# Patient Record
Sex: MALE | Race: WHITE | Employment: FULL TIME | URBAN - METROPOLITAN AREA
[De-identification: names, ages, dates, MRNs, and addresses within clinical notes are randomized per-mention and may not be internally consistent; named-entity substitution may affect disease eponyms.]

---

## 2017-01-16 ENCOUNTER — HOSPITAL ENCOUNTER (OUTPATIENT)
Dept: MRI IMAGING | Age: 52
Discharge: HOME | End: 2017-01-16
Payer: COMMERCIAL

## 2017-01-16 DIAGNOSIS — M47.22 CERVICAL RADICULOPATHY DUE TO DEGENERATIVE JOINT DISEASE OF SPINE: ICD-10-CM

## 2017-01-16 PROCEDURE — 72141 MRI NECK SPINE W/O DYE: CPT

## 2021-10-18 ENCOUNTER — HOSPITAL ENCOUNTER (EMERGENCY)
Age: 56
Discharge: PSYCHIATRIC OR SUBSTANCE USE FACILITY | End: 2021-10-19
Attending: EMERGENCY MEDICINE
Payer: COMMERCIAL

## 2021-10-18 DIAGNOSIS — R45.89 SUICIDAL BEHAVIOR WITHOUT ATTEMPTED SELF-INJURY: Primary | ICD-10-CM

## 2021-10-18 LAB
BASOPHILS # BLD: 0.1 K/UL (ref 0–0.1)
BASOPHILS NFR BLD: 1 % (ref 0–1)
DIFFERENTIAL METHOD BLD: ABNORMAL
EOSINOPHIL # BLD: 0.2 K/UL (ref 0–0.4)
EOSINOPHIL NFR BLD: 2 % (ref 0–7)
ERYTHROCYTE [DISTWIDTH] IN BLOOD BY AUTOMATED COUNT: 12 % (ref 11.5–14.5)
HCT VFR BLD AUTO: 47 % (ref 36.6–50.3)
HGB BLD-MCNC: 15.9 G/DL (ref 12.1–17)
IMM GRANULOCYTES # BLD AUTO: 0.1 K/UL (ref 0–0.04)
IMM GRANULOCYTES NFR BLD AUTO: 1 % (ref 0–0.5)
LYMPHOCYTES # BLD: 1.8 K/UL (ref 0.8–3.5)
LYMPHOCYTES NFR BLD: 14 % (ref 12–49)
MCH RBC QN AUTO: 29.5 PG (ref 26–34)
MCHC RBC AUTO-ENTMCNC: 33.8 G/DL (ref 30–36.5)
MCV RBC AUTO: 87.2 FL (ref 80–99)
MONOCYTES # BLD: 0.9 K/UL (ref 0–1)
MONOCYTES NFR BLD: 7 % (ref 5–13)
NEUTS SEG # BLD: 9.8 K/UL (ref 1.8–8)
NEUTS SEG NFR BLD: 75 % (ref 32–75)
NRBC # BLD: 0 K/UL (ref 0–0.01)
NRBC BLD-RTO: 0 PER 100 WBC
PLATELET # BLD AUTO: 317 K/UL (ref 150–400)
PMV BLD AUTO: 10.2 FL (ref 8.9–12.9)
RBC # BLD AUTO: 5.39 M/UL (ref 4.1–5.7)
WBC # BLD AUTO: 12.8 K/UL (ref 4.1–11.1)

## 2021-10-18 PROCEDURE — 85025 COMPLETE CBC W/AUTO DIFF WBC: CPT

## 2021-10-18 PROCEDURE — 80053 COMPREHEN METABOLIC PANEL: CPT

## 2021-10-18 PROCEDURE — 82077 ASSAY SPEC XCP UR&BREATH IA: CPT

## 2021-10-18 PROCEDURE — 80143 DRUG ASSAY ACETAMINOPHEN: CPT

## 2021-10-18 PROCEDURE — 99284 EMERGENCY DEPT VISIT MOD MDM: CPT

## 2021-10-18 PROCEDURE — 80179 DRUG ASSAY SALICYLATE: CPT

## 2021-10-19 ENCOUNTER — HOSPITAL ENCOUNTER (INPATIENT)
Age: 56
LOS: 2 days | Discharge: HOME | DRG: 881 | End: 2021-10-21
Attending: PSYCHIATRY & NEUROLOGY | Admitting: PSYCHIATRY & NEUROLOGY
Payer: COMMERCIAL

## 2021-10-19 VITALS
HEIGHT: 72 IN | WEIGHT: 202 LBS | RESPIRATION RATE: 17 BRPM | TEMPERATURE: 98 F | SYSTOLIC BLOOD PRESSURE: 133 MMHG | OXYGEN SATURATION: 95 % | DIASTOLIC BLOOD PRESSURE: 90 MMHG | BODY MASS INDEX: 27.36 KG/M2 | HEART RATE: 92 BPM

## 2021-10-19 PROBLEM — F32.1 MAJOR DEPRESSIVE DISORDER, SINGLE EPISODE, MODERATE (HCC): Status: ACTIVE | Noted: 2021-10-19

## 2021-10-19 PROBLEM — F43.21 ADJUSTMENT DISORDER WITH DEPRESSED MOOD: Status: ACTIVE | Noted: 2021-10-19

## 2021-10-19 LAB
ALBUMIN SERPL-MCNC: 3.9 G/DL (ref 3.5–5)
ALBUMIN/GLOB SERPL: 1.1 {RATIO} (ref 1.1–2.2)
ALP SERPL-CCNC: 53 U/L (ref 45–117)
ALT SERPL-CCNC: 32 U/L (ref 12–78)
AMPHET UR QL SCN: NEGATIVE
ANION GAP SERPL CALC-SCNC: 11 MMOL/L (ref 5–15)
APAP SERPL-MCNC: <2 UG/ML (ref 10–30)
APPEARANCE UR: CLEAR
AST SERPL-CCNC: 19 U/L (ref 15–37)
ATRIAL RATE: 86 BPM
BACTERIA URNS QL MICRO: NEGATIVE /HPF
BARBITURATES UR QL SCN: NEGATIVE
BENZODIAZ UR QL: NEGATIVE
BILIRUB SERPL-MCNC: 0.4 MG/DL (ref 0.2–1)
BILIRUB UR QL: NEGATIVE
BUN SERPL-MCNC: 9 MG/DL (ref 6–20)
BUN/CREAT SERPL: 11 (ref 12–20)
CALCIUM SERPL-MCNC: 9.3 MG/DL (ref 8.5–10.1)
CALCULATED P AXIS, ECG09: 42 DEGREES
CALCULATED R AXIS, ECG10: 55 DEGREES
CALCULATED T AXIS, ECG11: 39 DEGREES
CANNABINOIDS UR QL SCN: NEGATIVE
CHLORIDE SERPL-SCNC: 103 MMOL/L (ref 97–108)
CO2 SERPL-SCNC: 27 MMOL/L (ref 21–32)
COCAINE UR QL SCN: NEGATIVE
COLOR UR: ABNORMAL
COVID-19 RAPID TEST, COVR: NOT DETECTED
CREAT SERPL-MCNC: 0.81 MG/DL (ref 0.7–1.3)
DIAGNOSIS, 93000: NORMAL
DRUG SCRN COMMENT,DRGCM: NORMAL
EPITH CASTS URNS QL MICRO: ABNORMAL /LPF
ETHANOL SERPL-MCNC: <10 MG/DL
FLUAV RNA SPEC QL NAA+PROBE: NOT DETECTED
FLUBV RNA SPEC QL NAA+PROBE: NOT DETECTED
GLOBULIN SER CALC-MCNC: 3.7 G/DL (ref 2–4)
GLUCOSE SERPL-MCNC: 104 MG/DL (ref 65–100)
GLUCOSE UR STRIP.AUTO-MCNC: NEGATIVE MG/DL
HGB UR QL STRIP: NEGATIVE
KETONES UR QL STRIP.AUTO: 40 MG/DL
LEUKOCYTE ESTERASE UR QL STRIP.AUTO: NEGATIVE
METHADONE UR QL: NEGATIVE
NITRITE UR QL STRIP.AUTO: NEGATIVE
OPIATES UR QL: NEGATIVE
P-R INTERVAL, ECG05: 144 MS
PCP UR QL: NEGATIVE
PH UR STRIP: 6 [PH] (ref 5–8)
POTASSIUM SERPL-SCNC: 3.5 MMOL/L (ref 3.5–5.1)
PROT SERPL-MCNC: 7.6 G/DL (ref 6.4–8.2)
PROT UR STRIP-MCNC: NEGATIVE MG/DL
Q-T INTERVAL, ECG07: 372 MS
QRS DURATION, ECG06: 90 MS
QTC CALCULATION (BEZET), ECG08: 445 MS
RBC #/AREA URNS HPF: ABNORMAL /HPF (ref 0–5)
SALICYLATES SERPL-MCNC: 2.7 MG/DL (ref 2.8–20)
SARS-COV-2, COV2: NOT DETECTED
SODIUM SERPL-SCNC: 141 MMOL/L (ref 136–145)
SOURCE, COVRS: NORMAL
SP GR UR REFRACTOMETRY: 1.01 (ref 1–1.03)
UA: UC IF INDICATED,UAUC: ABNORMAL
UROBILINOGEN UR QL STRIP.AUTO: 0.2 EU/DL (ref 0.2–1)
VENTRICULAR RATE, ECG03: 86 BPM
WBC URNS QL MICRO: ABNORMAL /HPF (ref 0–4)

## 2021-10-19 PROCEDURE — 80307 DRUG TEST PRSMV CHEM ANLYZR: CPT

## 2021-10-19 PROCEDURE — 81001 URINALYSIS AUTO W/SCOPE: CPT

## 2021-10-19 PROCEDURE — 93005 ELECTROCARDIOGRAM TRACING: CPT

## 2021-10-19 PROCEDURE — 90792 PSYCH DIAG EVAL W/MED SRVCS: CPT | Performed by: PSYCHIATRY & NEUROLOGY

## 2021-10-19 PROCEDURE — 74011250637 HC RX REV CODE- 250/637: Performed by: PSYCHIATRY & NEUROLOGY

## 2021-10-19 PROCEDURE — 87636 SARSCOV2 & INF A&B AMP PRB: CPT

## 2021-10-19 PROCEDURE — 87635 SARS-COV-2 COVID-19 AMP PRB: CPT

## 2021-10-19 PROCEDURE — 65220000003 HC RM SEMIPRIVATE PSYCH

## 2021-10-19 RX ORDER — HYDROXYZINE PAMOATE 50 MG/1
50 CAPSULE ORAL
Status: DISCONTINUED | OUTPATIENT
Start: 2021-10-19 | End: 2021-10-21 | Stop reason: HOSPADM

## 2021-10-19 RX ORDER — ACETAMINOPHEN 325 MG/1
650 TABLET ORAL
Status: DISCONTINUED | OUTPATIENT
Start: 2021-10-19 | End: 2021-10-21 | Stop reason: HOSPADM

## 2021-10-19 RX ORDER — CITALOPRAM 20 MG/1
10 TABLET, FILM COATED ORAL DAILY
Status: DISCONTINUED | OUTPATIENT
Start: 2021-10-19 | End: 2021-10-21 | Stop reason: HOSPADM

## 2021-10-19 RX ORDER — ADHESIVE BANDAGE
30 BANDAGE TOPICAL DAILY PRN
Status: DISCONTINUED | OUTPATIENT
Start: 2021-10-19 | End: 2021-10-21 | Stop reason: HOSPADM

## 2021-10-19 RX ORDER — TRAZODONE HYDROCHLORIDE 50 MG/1
50 TABLET ORAL
Status: DISCONTINUED | OUTPATIENT
Start: 2021-10-19 | End: 2021-10-21 | Stop reason: HOSPADM

## 2021-10-19 RX ORDER — IBUPROFEN 200 MG
1 TABLET ORAL
Status: DISCONTINUED | OUTPATIENT
Start: 2021-10-19 | End: 2021-10-21 | Stop reason: HOSPADM

## 2021-10-19 RX ORDER — MAG HYDROX/ALUMINUM HYD/SIMETH 200-200-20
30 SUSPENSION, ORAL (FINAL DOSE FORM) ORAL
Status: DISCONTINUED | OUTPATIENT
Start: 2021-10-19 | End: 2021-10-21 | Stop reason: HOSPADM

## 2021-10-19 RX ORDER — IBUPROFEN 400 MG/1
400 TABLET ORAL
Status: DISCONTINUED | OUTPATIENT
Start: 2021-10-19 | End: 2021-10-21 | Stop reason: HOSPADM

## 2021-10-19 RX ADMIN — CITALOPRAM HYDROBROMIDE 10 MG: 20 TABLET ORAL at 15:53

## 2021-10-19 NOTE — PROGRESS NOTES
Problem: Falls - Risk of  Goal: *Absence of Falls  Description: Document Tripp Durham Fall Risk and appropriate interventions in the flowsheet.   Outcome: Progressing Towards Goal  Note: Fall Risk Interventions:            Medication Interventions: Teach patient to arise slowly                   Problem: Patient Education: Go to Patient Education Activity  Goal: Patient/Family Education  Outcome: Progressing Towards Goal

## 2021-10-19 NOTE — ROUTINE PROCESS
Shift change report given to Court Shameka Rn, re: SBAR. Medications, and behavior.   Jeanette Fall Risk Score (1)

## 2021-10-19 NOTE — BH NOTES
Shift change report given to Jacquelyn Radford RN re: SBAR, medications, and behavior.   Jeanette fall risk score 1

## 2021-10-19 NOTE — BH NOTES
TRANSFER - IN REPORT:    Verbal report received from Shama Ogden RN  on Darylene Friedlander  being received from  Silvano Tolbert for routine progression of care      Report consisted of patients Situation, Background, Assessment and   Recommendations(SBAR). Information from the following report(s) SBAR, Intake/Output, MAR, Recent Results and Med Rec Status was reviewed with the receiving nurse. Opportunity for questions and clarification was provided. Assessment completed upon patients arrival to unit and care assumed.

## 2021-10-19 NOTE — ED PROVIDER NOTES
Date of Service:  10/18/2021    Patient:  Nan Millan    Chief Complaint:  Mental Health Problem       HPI:  Nan Millan is a 55 y.o.  male who presents for evaluation of mental health evaluation. Patient's been having some issues with his wife. At some point tonight there seem to have been an argument where the patient notes that he \"halfheartedly\" threatened to kill himself. He states that he did this out of attention. He denies any homicidal or suicidal ideations. He denies any history of psychiatric issues. He arrives here under the care of [address] [place] [address] denying any acute complaints otherwise. Past Medical History:   Diagnosis Date    Chest pain     Mitral regurgitation     Myxomatous mitral valve        No past surgical history on file. Family History:   Problem Relation Age of Onset    Heart Disease Neg Hx     Hypertension Father     Diabetes Maternal Grandmother     Diabetes Maternal Grandfather        Social History     Socioeconomic History    Marital status:      Spouse name: Not on file    Number of children: 2    Years of education: Not on file    Highest education level: Not on file   Occupational History    Occupation: manager   Tobacco Use    Smoking status: Former Smoker     Packs/day: 1.00     Years: 5.00     Pack years: 5.00    Smokeless tobacco: Former User     Quit date: 2/1/2008   Substance and Sexual Activity    Alcohol use: Yes     Comment: beer on the weekends    Drug use: No    Sexual activity: Not on file   Other Topics Concern    Not on file   Social History Narrative    Not on file     Social Determinants of Health     Financial Resource Strain:     Difficulty of Paying Living Expenses:    Food Insecurity:     Worried About [address] in the Last Year:     [address] in the Last Year:    Transportation Needs:     Lack of Transportation (Medical):      Lack of Transportation (Non-Medical): Physical Activity:     Days of Exercise per Week:     Minutes of Exercise per Session:    Stress:     Feeling of Stress :    Social Connections:     Frequency of Communication with Friends and Family:     Frequency of Social Gatherings with Friends and Family:     Attends Yarsani Services:     Active Member of Clubs or Organizations:     Attends Club or Organization Meetings:     Marital Status:    Intimate Partner Violence:     Fear of Current or Ex-Partner:     Emotionally Abused:     Physically Abused:     Sexually Abused: ALLERGIES: Patient has no known allergies. Review of Systems   Constitutional: Negative for fever. HENT: Negative for hearing loss. Eyes: Negative for visual disturbance. Respiratory: Negative for shortness of breath. Cardiovascular: Negative for chest pain. Gastrointestinal: Negative for abdominal pain. Genitourinary: Negative for flank pain. Musculoskeletal: Negative for back pain. Skin: Negative for rash. Neurological: Negative for dizziness and light-headedness. Psychiatric/Behavioral: Negative for confusion and hallucinations. Vitals:    10/18/21 2255   BP: (!) 144/91   Pulse: 94   Resp: 16   Temp: 97.8 °F (36.6 °C)   SpO2: 97%   Weight: 91.6 kg (202 lb)   Height: 6' (1.829 m)            Physical Exam  Vitals and nursing note reviewed. Constitutional:       Appearance: Normal appearance. HENT:      Head: Normocephalic and atraumatic. Eyes:      General: No scleral icterus. Cardiovascular:      Rate and Rhythm: Normal rate. Pulmonary:      Effort: Pulmonary effort is normal.   Abdominal:      General: Abdomen is flat. Musculoskeletal:         General: Normal range of motion. Skin:     General: Skin is warm. Capillary Refill: Capillary refill takes less than 2 seconds. Neurological:      Mental Status: He is alert and oriented to person, place, and time.    Psychiatric:         Mood and Affect: Mood normal. Thought Content: Thought content is not paranoid or delusional. Thought content does not include homicidal or suicidal ideation. Thought content does not include suicidal plan. [facility]  ED Course as of Oct 19 0333   Tue Oct 19, 2021   0021 EKG 00 16  Normal sinus rhythm at 86 bpm with normal axis, normal intervals. No STEMI    [GG]   0133 COVID-19 rapid test: Not detected [GG]      ED Course User Index  [GG] Charley Apo, DO     VITAL SIGNS:  No data found. LABS:  Recent Results (from the past 6 hour(s))   CBC WITH AUTOMATED DIFF    Collection Time: 10/18/21 11:19 PM   Result Value Ref Range    WBC 12.8 (H) 4.1 - 11.1 K/uL    RBC 5.39 4. 10 - 5.70 M/uL    HGB 15.9 12.1 - 17.0 g/dL    HCT 47.0 36.6 - 50.3 %    MCV 87.2 80.0 - 99.0 FL    MCH 29.5 26.0 - 34.0 PG    MCHC 33.8 30.0 - 36.5 g/dL    RDW 12.0 11.5 - 14.5 %    PLATELET 151 496 - 853 K/uL    MPV 10.2 8.9 - 12.9 FL    NRBC 0.0 0  WBC    ABSOLUTE NRBC 0.00 0.00 - 0.01 K/uL    NEUTROPHILS 75 32 - 75 %    LYMPHOCYTES 14 12 - 49 %    MONOCYTES 7 5 - 13 %    EOSINOPHILS 2 0 - 7 %    BASOPHILS 1 0 - 1 %    IMMATURE GRANULOCYTES 1 (H) 0.0 - 0.5 %    ABS. NEUTROPHILS 9.8 (H) 1.8 - 8.0 K/UL    ABS. LYMPHOCYTES 1.8 0.8 - 3.5 K/UL    ABS. MONOCYTES 0.9 0.0 - 1.0 K/UL    ABS. EOSINOPHILS 0.2 0.0 - 0.4 K/UL    ABS. BASOPHILS 0.1 0.0 - 0.1 K/UL    ABS. IMM.  GRANS. 0.1 (H) 0.00 - 0.04 K/UL    DF AUTOMATED     METABOLIC PANEL, COMPREHENSIVE    Collection Time: 10/18/21 11:19 PM   Result Value Ref Range    Sodium 141 136 - 145 mmol/L    Potassium 3.5 3.5 - 5.1 mmol/L    Chloride 103 97 - 108 mmol/L    CO2 27 21 - 32 mmol/L    Anion gap 11 5 - 15 mmol/L    Glucose 104 (H) 65 - 100 mg/dL    BUN 9 6 - 20 MG/DL    Creatinine 0.81 0.70 - 1.30 MG/DL    BUN/Creatinine ratio 11 (L) 12 - 20      GFR est AA >60 >60 ml/min/1.73m2    GFR est non-AA >60 >60 ml/min/1.73m2    Calcium 9.3 8.5 - 10.1 MG/DL    Bilirubin, total 0.4 0.2 - 1.0 MG/DL    ALT (SGPT) 32 12 - 78 U/L    AST (SGOT) 19 15 - 37 U/L    Alk. phosphatase 53 45 - 117 U/L    Protein, total 7.6 6.4 - 8.2 g/dL    Albumin 3.9 3.5 - 5.0 g/dL    Globulin 3.7 2.0 - 4.0 g/dL    A-G Ratio 1.1 1.1 - 2.2     ETHYL ALCOHOL    Collection Time: 10/18/21 11:19 PM   Result Value Ref Range    ALCOHOL(ETHYL),SERUM <13 <41 MG/DL   SALICYLATE    Collection Time: 10/18/21 11:19 PM   Result Value Ref Range    Salicylate level 2.7 (L) 2.8 - 20.0 MG/DL   ACETAMINOPHEN    Collection Time: 10/18/21 11:19 PM   Result Value Ref Range    Acetaminophen level <2 (L) 10 - 30 ug/mL   COVID-19 RAPID TEST    Collection Time: 10/19/21 12:00 AM   Result Value Ref Range    Specimen source Nasopharyngeal      COVID-19 rapid test Not detected NOTD     URINALYSIS W/ REFLEX CULTURE    Collection Time: 10/19/21  1:30 AM    Specimen: Urine   Result Value Ref Range    Color YELLOW/STRAW      Appearance CLEAR CLEAR      Specific gravity 1.015 1.003 - 1.030      pH (UA) 6.0 5.0 - 8.0      Protein Negative NEG mg/dL    Glucose Negative NEG mg/dL    Ketone 40 (A) NEG mg/dL    Bilirubin Negative NEG      Blood Negative NEG      Urobilinogen 0.2 0.2 - 1.0 EU/dL    Nitrites Negative NEG      Leukocyte Esterase Negative NEG      WBC 0-4 0 - 4 /hpf    RBC 0-5 0 - 5 /hpf    Epithelial cells FEW FEW /lpf    Bacteria Negative NEG /hpf    UA:UC IF INDICATED CULTURE NOT INDICATED BY UA RESULT     DRUG SCREEN, URINE    Collection Time: 10/19/21  1:30 AM   Result Value Ref Range    AMPHETAMINES Negative NEG      BARBITURATES Negative NEG      BENZODIAZEPINES Negative NEG      COCAINE Negative NEG      METHADONE Negative NEG      OPIATES Negative NEG      PCP(PHENCYCLIDINE) Negative NEG      THC (TH-CANNABINOL) Negative NEG      Drug screen comment (NOTE)         IMAGING:  No orders to display         Medications During Visit:  Medications - No data to display      DECISION MAKING:  Neel Medrano is a 55 y.o. male who comes in as above.   Here, patient admits to threatening to kill himself earlier. Per nursing and police, patient had earlier given away belongings, written a note and was walking around the house with a gun to his head and had to be talked down by police. Patient is agreeable to stay. He is medically clear. Patient awaiting placement for psychiatric admission      IMPRESSION:  1.  Suicidal behavior without attempted self-injury        DISPOSITION:  Behavioral Health Hold          Procedures
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
## 2021-10-19 NOTE — BH NOTES
Admission Note:     Patient arrived on the unit @ 1040 on a TDO admission from [address]. Patient was escorted on the unit in wheelchair by [address] [facility] [address] [facility]. Dr. Fay Stout, Akiko Weber, Nurse Manager and Corona Nadine, Nursing Supervisor were notified of patient's arrival. Consult and Jama Kirkland, for Medical H&P. Patient alert and oriented x 4. Calm and cooperative. Mood/affect depressed. Speech clear. Denies SI/HI/AVH and pain. No delusional statements made. Patient denied having a license with [employer]. Patient smokes cigarettes daily. Patient was counseled on smoking cessation to include recognizing dangerous situations, coping skills, and information on quitting. Patient verbalized understanding/ Nicotine patch ordered. Patient refused nicotine patch at this time. atient denies ETOH use and substance abuse. Treatment plan started. Patient placed on Q 15 minute safety checks, suicide, and fall precautions. Rolf Bean

## 2021-10-19 NOTE — PROGRESS NOTES
Problem: Falls - Risk of  Goal: *Absence of Falls  Description: Document Collin Christina Fall Risk and appropriate interventions in the flowsheet.   Outcome: Progressing Towards Goal  Note: Fall Risk Interventions:            Medication Interventions: Teach patient to arise slowly

## 2021-10-19 NOTE — MASTER TREATMENT PLAN
[facility]  Master Treatment Plan for Audrey Hunger    Date Treatment Plan Initiated: 10/19/2021    Treatment Plan Modalities:  Type of Modality Amount  (x minutes) Frequency (x/week) Duration (x days) Name of Responsible Staff   [facility] meetings to encourage peer interactions 30 14 1 Cruz Ramos., CNA/MHT  Lauren Wick., MHT   Group psychotherapy to assist in building coping skills and internal controls 60 5 1 Gali Dorsey., SHAZIAW  [facility] Park., [facility]   Therapeutic activity groups to build coping skills 61 7 1 Ashleigh Gama., MHT     Psychoeducation in group setting to address:   Medication education  Coping Skills  Symptom Management   60 7 1 Gali Dorsey., SHAZIAW  Jc Park., [facility]  Jeff Ho., PAULO Rodriguez RN   Discharge planning   60 2 1 Yadiel Starr.,    Spirituality    60 2 1 Ki William.   Physician medication management   15 7 1 MD SHAKILA Lopez. Lindsay Cervantes MD                                       Treatment Team Signatures    I have participated in the development of this plan of treatment and agree to its implementation.     Patient Signature       Patient Printed Name Date/Time   Social Work/Therapist Signature       Social Work/Therapist Printed Name Date/Time   RN Signature       RN Printed Name Date/Time   Other Signature     Other Signature Date/Time   MD Signature       MD Printed Name Date/Time

## 2021-10-19 NOTE — ED TRIAGE NOTES
Triage note:[facility] for suicidal ideation. patient denies being suicidal denies any pain or discomfort. patient is calm and cooperative. police at bedside.

## 2021-10-20 LAB
CHOLEST SERPL-MCNC: 164 MG/DL
COMMENT, HOLDF: NORMAL
HDLC SERPL-MCNC: 51 MG/DL
HDLC SERPL: 3.2 {RATIO} (ref 0–5)
LDLC SERPL CALC-MCNC: 97 MG/DL (ref 0–100)
SAMPLES BEING HELD,HOLD: NORMAL
TRIGL SERPL-MCNC: 80 MG/DL (ref ?–150)
TSH SERPL DL<=0.05 MIU/L-ACNC: 1.03 UIU/ML (ref 0.36–3.74)
VLDLC SERPL CALC-MCNC: 16 MG/DL

## 2021-10-20 PROCEDURE — 65220000003 HC RM SEMIPRIVATE PSYCH

## 2021-10-20 PROCEDURE — 74011250637 HC RX REV CODE- 250/637: Performed by: PSYCHIATRY & NEUROLOGY

## 2021-10-20 PROCEDURE — 80061 LIPID PANEL: CPT

## 2021-10-20 PROCEDURE — 36415 COLL VENOUS BLD VENIPUNCTURE: CPT

## 2021-10-20 PROCEDURE — 99231 SBSQ HOSP IP/OBS SF/LOW 25: CPT | Performed by: PSYCHIATRY & NEUROLOGY

## 2021-10-20 PROCEDURE — 84443 ASSAY THYROID STIM HORMONE: CPT

## 2021-10-20 RX ADMIN — CITALOPRAM HYDROBROMIDE 10 MG: 20 TABLET ORAL at 10:12
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
## 2021-10-20 NOTE — ROUTINE PROCESS
Shift change report given to Belem Leal RN; re:SBAR, medications, and behavior. Jeanette Fall Risk Score 1.

## 2021-10-20 NOTE — ROUTINE PROCESS
[place] [id number] ([phone])            October 20, 2021      To Whom It May Concern: This letter is verification that Audrey Nolan is hospitalized at [facility] starting 10/19/21.  If you have any further questions, you may contact me at [phone]      Sincerely,      Abdi Leiva,

## 2021-10-20 NOTE — PROGRESS NOTES
10/20/21 [address] meeting   Attendance Did not attend   Number of participants 4   Time in 1115   Time out 1145   Total Time 30   MTP problem Depression

## 2021-10-20 NOTE — GROUP NOTE
[facility]  GROUP DOCUMENTATION INDIVIDUAL                                                                          Group Therapy Note    Date: 10/20/2021    Group Start Time: 0900  Group End Time: 7097  Group Topic: Process Group - Inpatient    [address] OP    [place], [address] [address] [facility] GROUP DOCUMENTATION GROUP    Group Therapy Note  Focus of session was on conflict resolution and strategies to help us keep calm in a conflict with others. I shared how conflicts can increase the production of cortisol and adrenaline and how that prepares us to either take flight or fight. We spoke about how conflicts can be over something pretty minor to values and very important issues in our lives. I shared how the increase in hormones affects us when we need to be clear minded and aware but those hormones can create a disorientation and confusion. I shared with group some strategies to help us stay calm in a conflict and they included taking deep breaths which actually interferes with the production of these hormones, focusing on body and where we can work to relax it, listen carefully and ask open ended questions, lower our voices, and work to let go and agree to disagree with others. We spoke about with whom group members can practice these strategies         Attendance: Attended     Patient's Goal:    Verbalizes anger, guilt, and other feelings in a constructive manor          Interventions/techniques: Informed, Validated, Promoted peer support, Provide feedback, Reinforced and Supported    Follows Directions:  Followed directions    Interactions: Interacted appropriately    Mental Status: Anxious, Calm, Congruent, Depressed and Worried    Behavior/appearance: Attentive, Cooperative and Motivated    Goals Achieved: Able to engage in interactions, Able to listen to others, Able to reflect/comment on own behavior, Able to manage/cope with feelings, Able to receive feedback, Able to self-disclose, Discussed coping, Discussed discharge plans, Discussed self-esteem issues, Discussed safety plan, Displayed empathy, Identified feelings, Identified triggers and Identified relapse prevention strategies      Additional Notes:  Pt participated and engaged in the group discussion. He shared a lot about his wife and the fact that she is Bipolar and is an alcoholic. He at this time is not taking her calls. His time with her in the last year has been \"roller coaster ride\"It is good when she is not drinking but chaotic when she is not taking her medicine and drinking. He is struggling with \"not giving up on her\" but also knows his relationship with her and her relationship with his children is unhealthy. He admits that he did not want to kill himself but wanted to get her attention. We discussed discharge plans and he will continue to go to therapy and [facility] meetings.      Domingo Meza

## 2021-10-20 NOTE — SUICIDE SAFETY PLAN
SAFETY PLAN    A suicide Safety Plan is a document that supports someone when they are having thoughts of suicide. Warning Signs that indicate a suicidal crisis may be developing: What (situations, thoughts, feelings, body sensations, behaviors, etc.) do you experience that lets you know you are beginning to think about suicide? 1. Detach from memories  2. Lies and misleading comments  3. Thinking about all of her statements    Internal Coping Strategies:  What things can I do (relaxation techniques, hobbies, physical activities, etc.) to take my mind off my problems without contacting another person? 1. No contact with ling  2. Continue individual therapy and alanon  3. Continue daily exercise    People and social settings that provide distraction: Who can I call or where can I go to distract me? 1. Name: Carie Jose Juan  Phone: [phone]  2. Name:rishabh   Phone:    3. Place:shailesh             4. Place: vero    People whom I can ask for help: Who can I call when I need help - for example, friends, family, clergy, someone else? 1. Name: Saul                 Phone:   2. Name: Socorro Russ  Phone:   3. Name:Liu   Phone:     Professionals or [employer] agencies I can contact during a crisis: Who can I call for help - for example, my doctor, my psychiatrist, my psychologist, a mental health provider, a suicide hotline? 1. Clinician Name: Maryanne Turner    Phone:       Clinician Pager or Emergency Contact #:     2. Clinician Name: Dr. Becca Schmitt    Phone:       Clinician Pager or Emergency Contact #:     3. Suicide Prevention Lifeline: 2-985-685-TALK (4110)    4. [address] [place] [address] Emergency Services -  for example, [facility], local county suicide hotline, [facility] Hotline: [phone]      Emergency Services Address: [address] [place] [address]      Emergency Services Phone: [phone]    Making the environment safe:  How can I make my environment (house/apartment/living space) safer? For example, can I remove guns, medications, and other items? 1. Sell firearm  2.  Discard old medication

## 2021-10-20 NOTE — BH NOTES
Affect blunted, mood depressed. Pt attended and participated in scheduled group activities. Pt was social and appropriate with staff and peers. Pt interacted well. Pt denies SI/HI/AVH/Pain. Pt spoke to his wife on the phone and then asked staff to remove her form his emergency contacts, and for her not to be allowed to speak to him or be given any information about him. Pt was very open to talking about his relationship in group and seems to \"blame her\" for everything that is happening. His wife did get very loud and argumentative with staff when she called back and wasn't allowed to speak to patient. Pt has no scheduled medications and did not request a PRN. Pt ate 100% of snack. Pt is in no apparent distress at this time and made no delusional statements. Pt rested in his bed with his eyes closed for 7.5 hours.

## 2021-10-20 NOTE — PROGRESS NOTES
10/19/21 2117   Group Therapy   Group Community meeting   Attendance Attended   Number of participants 7   Time in 2000   Time out 2045   Total Time 45   Interventions/techniques Supported   Follows directions Followed directions   Interactions Interacted appropriately   Mental Status Calm   Behavior/appearance Cooperative   Suicide Risk Factors no suicidal thoughts   Suicide Protective Factors Denies intent   Goals Achieved Able to engage in interactions; Able to listen to others     He has no anxiety, depression, or pain at this time.

## 2021-10-20 NOTE — PROGRESS NOTES
INTERVAL Hx:  Records and clinical information were reviewed. States he has decided to distance himself from his wife due to her refusal to stop drinking and how she continues to be very contentious and and angry when she's drunk. Seems to have good insight and judgment re: this at present, but he also realizes he's prone to going back to her when she's nice. Discussed these dynamics further and he adamantly denies having any SI or intent to harm himself now. Tolerating the Celexa--will stay on it for now. Slept 7.5 hours last night. Hearing tomorrow--will likely be released.     MEDS:  Current Facility-Administered Medications   Medication Dose Route Frequency    acetaminophen (TYLENOL) tablet 650 mg  650 mg Oral Q4H PRN    ibuprofen (MOTRIN) tablet 400 mg  400 mg Oral Q8H PRN    magnesium hydroxide (MILK OF MAGNESIA) 400 mg/5 mL oral suspension 30 mL  30 mL Oral DAILY PRN    nicotine (NICODERM CQ) 21 mg/24 hr patch 1 Patch  1 Patch TransDERmal DAILY PRN    traZODone (DESYREL) tablet 50 mg  50 mg Oral QHS PRN    hydrOXYzine pamoate (VISTARIL) capsule 50 mg  50 mg Oral Q6H PRN    alum-mag hydroxide-simeth (MYLANTA) oral suspension 30 mL  30 mL Oral Q4H PRN    citalopram (CELEXA) tablet 10 mg  10 mg Oral DAILY       VITALS:   Patient Vitals for the past 12 hrs:   Temp Pulse Resp BP SpO2   10/20/21 0725 98 °F (36.7 °C) 85 17 120/85 99 %       LABS: .  Recent Results (from the past 24 hour(s))   TSH 3RD GENERATION    Collection Time: 10/20/21  6:12 AM   Result Value Ref Range    TSH 1.03 0.36 - 3.74 uIU/mL   LIPID PANEL    Collection Time: 10/20/21  6:12 AM   Result Value Ref Range    Cholesterol, total 164 <200 MG/DL    Triglyceride 80 <150 MG/DL    HDL Cholesterol 51 MG/DL    LDL, calculated 97 0 - 100 MG/DL    VLDL, calculated 16 MG/DL    CHOL/HDL Ratio 3.2 0.0 - 5.0     SAMPLES BEING HELD    Collection Time: 10/20/21  6:12 AM   Result Value Ref Range    SAMPLES BEING HELD 1LAV     COMMENT        Add-on orders for these samples will be processed based on acceptable specimen integrity and analyte stability, which may vary by analyte. MSE:   Appearance: casually dressed; well groomed  Behavior: calm and cooperative; no agitation  Motor: normal  Mood/Affect: euthymic but restricted  Thought Process: coherent; organized  Thought Content: no delusions; no SI/HI  Perceptions: no hallucinations  Insight/Judgment: good    ASSESSMENT:   1. Adjustment Disorder with depressed mood (F43.21)    PLAN:  1. Continue the Celexa at 10 mg daily. 2. Likely will be released tomorrow to continue with outpatient therapy.

## 2021-10-20 NOTE — BH NOTES
Affect variable, mood depressed/anxious, calm and cooperative. Alert and oriented X 4. Attended and participated in group. Interacted with staff and peers. Makes needs known. Ate all meals and snacks. Denied SI/HI/AVH and pain. Medication compliant. Stable with no s/s of distress. Pt wanting to go home tomorrow. Visible in [facility] today. Received and made several calls today.

## 2021-10-20 NOTE — BH NOTES
PSYCHOSOCIAL ASSESSMENT  :Patient identifying info:   Rupinder Reynaga is a 55 y.o., male admitted 10/19/2021 10:33 AM     Presenting problem and precipitating factors: patient was having suicidal thoughts with means and a plan and had written his will and other instrutions      Mental status assessment: Appearance: casually dressed; well groomed  Behavior: calm and cooperative; no agitation  Motor: normal  Mood/Affect: euthymic but restricted  Thought Process: coherent; organized  Thought Content: no delusions; no SI/HI  Perceptions: no hallucinations  Insight/Judgment: good    Strengths: employed, educated, able to care for self, has a place to return to    Collateral information: patient, records from previous hospital    Current psychiatric /substance abuse providers and contact info: no    Previous psychiatric/substance abuse providers and response to treatment: no    Family history of mental illness or substance abuse: parents were alcoholics    Substance abuse history:  denies  Social History     Tobacco Use    Smoking status: Former Smoker     Packs/day: 1.00     Years: 5.00     Pack years: 5.00    Smokeless tobacco: Former User     Quit date: 2/1/2008   Substance Use Topics    Alcohol use: Yes     Comment: beer on the weekends       History of biomedical complications associated with substance abuse : n/a    Patient's current acceptance of treatment or motivation for change: n/a    Family constellation: 2 children    Is significant other involved?  Yes patient is not wanting her to be informed now      Describe support system: friends    Describe living arrangements and home environment: lives alone 2 children live with him    Health issues:   Hospital Problems  Date Reviewed: 6/17/2014        Codes Class Noted POA    * (Principal) Adjustment disorder with depressed mood ICD-10-CM: F43.21  ICD-9-CM: 309.0  10/19/2021 Unknown        Major depressive disorder, single episode, moderate ([facility] ICD-10-CM: F32.1  ICD-9-CM: 296.22  10/19/2021 Unknown              Trauma history: denies    Legal issues: denies    History of  service: denies    Financial status: employed    Faith/cultural factors: none expressed    Education/work history:  for a [employer] company    Have you been licensed as a health care professional (current or ): no    Leisure and recreation preferences: exercise    Describe coping skills: bible study, exercise, venecia groups, therapy sessions    True Bhandari  10/20/2021

## 2021-10-20 NOTE — BH NOTES
Behavioral Health Interdisciplinary Rounds     Patient Name: Donell Strong  Age: 55 y.o. Room/Bed:  230/02  Primary Diagnosis: Adjustment disorder with depressed mood   Admission Status: TDO     Readmission within 30 days: no  Power of  in place: no  Patient requires a blocked bed: no          Reason for blocked bed:     VTE Prophylaxis: Not indicated    Mobility needs/Fall risk: yes  Flu Vaccine : no   Nutritional Plan: no  Consults: no         Labs/Testing due today?: no    Sleep hours: 7.5       Participation in Care/Groups:  yes  Medication Compliant?: Yes  PRNS (last 24 hours): None    Restraints (last 24 hours):  no     CIWA (range last 24 hours):     COWS (range last 24 hours):      Alcohol screening (AUDIT) completed -   AUDIT Score: 3     If applicable, date SBIRT discussed in treatment team AND documented:   AUDIT Screen Score: AUDIT Score: 3      Document Brief Intervention (corresponds directly with the 5 A's, Ask, Advise, Assess, Assist, and Arrange): At- Risk Patients (Score 7-15 for women; 8-15 for men)  Discuss concern patient is drinking at unhealthy levels known to increase risk of alcohol-related health problems. Is Patient ready to commit to change? If No:   Encourage reflection   Discuss short term and long term health risks of consuming alcohol   Barriers to change   Reaffirm willingness to help / Educational materials provided  If Yes:   Set goal  [place] provided    Harmful use or Dependence (Score 16 or greater)   Discuss short term and long term health risks of consuming alcohol   Recommendations   Negotiate drinking goal   Recommend addiction specialist/center   Arrange follow-up appointments.     Tobacco - patient is a smoker: Have You Used Tobacco in the Past 30 Days: Yes  Illegal Drugs use: Have You Used Any Illegal Substances Over the Past 12 Months: No    24 hour chart check complete: yes     Patient goal(s) for today: to get home  Treatment team focus/goals: patient education  Progress note patient will have TDO hearing tomorrow, he denies SI    LOS:  1  Expected LOS: 3-5    Financial concerns/prescription coverage:  no  Family contact: no      Family requesting physician contact today:  no  Discharge plan: home  Access to weapons : no        Outpatient provider(s): yes  Patient's preferred phone number for follow up call : [phone]    Participating treatment team members: Christelle Joseph, Dr. Bhanu Stone * (assigned SW), Oscar Llamas

## 2021-10-21 VITALS
RESPIRATION RATE: 18 BRPM | SYSTOLIC BLOOD PRESSURE: 152 MMHG | DIASTOLIC BLOOD PRESSURE: 77 MMHG | WEIGHT: 203 LBS | TEMPERATURE: 98 F | HEART RATE: 92 BPM | HEIGHT: 72 IN | BODY MASS INDEX: 27.5 KG/M2 | OXYGEN SATURATION: 97 %

## 2021-10-21 PROBLEM — F32.1 MAJOR DEPRESSIVE DISORDER, SINGLE EPISODE, MODERATE (HCC): Status: RESOLVED | Noted: 2021-10-19 | Resolved: 2021-10-21

## 2021-10-21 PROCEDURE — 99239 HOSP IP/OBS DSCHRG MGMT >30: CPT | Performed by: PSYCHIATRY & NEUROLOGY

## 2021-10-21 PROCEDURE — 74011250637 HC RX REV CODE- 250/637: Performed by: PSYCHIATRY & NEUROLOGY

## 2021-10-21 RX ORDER — CITALOPRAM 20 MG/1
20 TABLET, FILM COATED ORAL DAILY
Qty: 30 TABLET | Refills: 1 | Status: SHIPPED | OUTPATIENT
Start: 2021-10-22

## 2021-10-21 RX ADMIN — CITALOPRAM HYDROBROMIDE 10 MG: 20 TABLET ORAL at 08:34

## 2021-10-21 NOTE — GROUP NOTE
[facility] GROUP DOCUMENTATION INDIVIDUAL                                                                          Group Therapy Note    Date: 10/21/2021    Group Start Time: 0900  Group End Time: 3689  Group Topic: Process Group - Inpatient    [address] [facility] GROUP DOCUMENTATION GROUP    Group Therapy Note  Focus of session was on developing healthy boundaries with others and the signs of healthy and unhealthy boundaries. We explored current relationships with group members and identified what kinds of boundaries are present and what boundaries need to be set. We spoke about physical, emotional, time, and intimate boundaries that we can set. We also spoke about how to allow others to set boundaries with us and to work to equip them with how to support in times of need and/or crisis. We identified what changes we can make today.            Attendance: {[facility] ATTENDANCE:31046}    Patient's Goal:  ***    Interventions/techniques: {[facility] GROUP DOC INTERVENTIONS/TECHNIQUES:73449}    Follows Directions: {[facility] FOLLOWS DIRECTION:34398}    Interactions: {[facility] INTERACTIONS:19176}    Mental Status: {[facility] GROUP DOC MENTAL STATUS:86631}    Behavior/appearance: {[facility] GROUP DOC BEHAVIOR/APPEARANCE:50658}    Goals Achieved: {[facility] GROUP DOC GOALS ACHIEVED:97589}      Additional Notes:  ***    Simona Nelson

## 2021-10-21 NOTE — BH NOTES
Behavioral Health Transition Record to Provider    Patient Name: Pam Boyer  YOB: 1965  Medical Record Number: [id number]  Date of Admission: 10/19/2021  Date of Discharge: 10/21/2021    Attending Provider: Brittany Ambrose MD  Discharging Provider: Renny Newsome  To contact this individual call [phone] and ask the  to page. If unavailable, ask to be transferred to [address] [place] [address] Provider on call. [facility] Provider will be available on call 24/7 and during holidays. Primary Care Provider: None    No Known Allergies    Reason for Admission: depression with SI    Admission Diagnosis: Major depressive disorder, single episode, moderate (HCC) [F32.1]    * No surgery found *    Results for orders placed or performed during the hospital encounter of 10/19/21   TSH 3RD GENERATION   Result Value Ref Range    TSH 1.03 0.36 - 3.74 uIU/mL   LIPID PANEL   Result Value Ref Range    Cholesterol, total 164 <200 MG/DL    Triglyceride 80 <150 MG/DL    HDL Cholesterol 51 MG/DL    LDL, calculated 97 0 - 100 MG/DL    VLDL, calculated 16 MG/DL    CHOL/HDL Ratio 3.2 0.0 - 5.0     SAMPLES BEING HELD   Result Value Ref Range    SAMPLES BEING HELD 1LAV     COMMENT        Add-on orders for these samples will be processed based on acceptable specimen integrity and analyte stability, which may vary by analyte. Immunizations administered during this encounter: There is no immunization history on file for this patient. Screening for Metabolic Disorders for Patients on Antipsychotic Medications  (Data obtained from the EMR)    Estimated Body Mass Index  Estimated body mass index is 27.53 kg/m² as calculated from the following:    Height as of this encounter: 6' (1.829 m). Weight as of this encounter: 92.1 kg (203 lb).      Vital Signs/Blood Pressure  Visit Vitals  BP (!) 152/77 (BP 1 Location: Left arm, BP Patient Position: Sitting)   Pulse 92   Temp 98 °F (36.7 °C)   Resp 18   Ht 6' (1.829 m)   Wt 92.1 kg (203 lb)   SpO2 97%   BMI 27.53 kg/m²       Blood Glucose/Hemoglobin A1c  Lab Results   Component Value Date/Time    Glucose 104 (H) 10/18/2021 11:19 PM       No results found for: HBA1C, [id number]     Lipid Panel  Lab Results   Component Value Date/Time    Cholesterol, total 164 10/20/2021 06:12 AM    HDL Cholesterol 51 10/20/2021 06:12 AM    LDL, calculated 97 10/20/2021 06:12 AM    Triglyceride 80 10/20/2021 06:12 AM    CHOL/HDL Ratio 3.2 10/20/2021 06:12 AM        Discharge Diagnosis: Adjustment Disorder with Depressed Mood    Discharge Plan: Please keep all scheduled follow up appointments. If you are unable to keep your appointment with your physician or therapist, please call them at least 24 hours in advance, if possible, so another appointment can be scheduled. It is important that you maintain contact with your physician(s) after discharge. Discharge Medication List and Instructions:   Current Discharge Medication List      START taking these medications    Details   citalopram (CELEXA) 20 mg tablet Take 1 Tablet by mouth daily.   Qty: 30 Tablet, Refills: 1  Start date: 10/22/2021         STOP taking these medications       tamsulosin (FLOMAX) 0.4 mg capsule Comments:   Reason for Stopping:         naproxen (NAPROSYN) 500 mg tablet Comments:   Reason for Stopping:         diazepam (VALIUM) 5 mg tablet Comments:   Reason for Stopping:         methylPREDNISolone (MEDROL DOSEPACK) 4 mg tablet Comments:   Reason for Stopping:         oxyCODONE-acetaminophen (PERCOCET 10)  mg per tablet Comments:   Reason for Stopping:         cyclobenzaprine (FLEXERIL) 10 mg tablet Comments:   Reason for Stopping:         predniSONE (DELTASONE) 10 mg tablet Comments:   Reason for Stopping:               Unresulted Labs (24h ago, onward)    None        To obtain results of studies pending at discharge, please contact     Follow-up Information     Follow up With Specialties Details Why Contact Haider Austin LPC  Schedule an appointment as soon as possible for a visit standing weekly session for therapy via zoom [address] [facility] [id number] [phone]    Dr. Joseph Birmingham an appointment as soon as possible for a visit For medication management Patient will schedule around his work hours [id number] [facility] Adi Gutierrez [facility]  [phone]          Advanced Directive:   Does the patient have an appointed surrogate decision maker? No  Does the patient have a Medical Advance Directive? No  Does the patient have a Psychiatric Advance Directive? No  If the patient does not have a surrogate or Medical Advance Directive AND Psychiatric Advance Directive, the patient was offered information on these advance directives Yes and Patient will complete at a later time    Patient Instructions: Please continue all medications until otherwise directed by physician. Tobacco Cessation Discharge Plan:   Is the patient a smoker and needs referral for smoking cessation? Yes  Patient referred to the following for smoking cessation with an appointment? Yes     Patient was offered medication to assist with smoking cessation at discharge? Refused  Was education for smoking cessation added to the discharge instructions? Yes    Alcohol/Substance Abuse Discharge Plan:   Does the patient have a history of substance/alcohol abuse and requires a referral for treatment? No  Patient referred to the following for substance/alcohol abuse treatment with an appointment? Not applicable  Patient was offered medication to assist with alcohol cessation at discharge? Not applicable  Was education for substance/alcohol abuse added to discharge instructions? Not applicable    Patient discharged to Home; discussed with patient/caregiver and provided to the patient/caregiver either in hard copy or electronically.

## 2021-10-21 NOTE — PROGRESS NOTES
Problem: Falls - Risk of  Goal: *Absence of Falls  Description: Document   Fall Risk and appropriate interventions in the flowsheet.   10/21/2021 1059 by Janine Aguilar RN  Outcome: Resolved/Met  Note: Fall Risk Interventions:            Medication Interventions: Teach patient to arise slowly                10/21/2021 1015 by Janine Aguilar RN  Outcome: Progressing Towards Goal  Note: Fall Risk Interventions:            Medication Interventions: Teach patient to arise slowly                   Problem: Patient Education: Go to Patient Education Activity  Goal: Patient/Family Education  10/21/2021 1059 by Janine Aguilar RN  Outcome: Resolved/Met  10/21/2021 1015 by Janine Aguilar RN  Outcome: Progressing Towards Goal     Problem: Patient Education: Go to Patient Education Activity  Goal: Patient/Family Education  10/21/2021 1059 by Janine Aguilar RN  Outcome: Resolved/Met  10/21/2021 1015 by Janine Aguilar RN  Outcome: Progressing Towards Goal     Problem: Sleep Disturbance  Goal: *LTG: Restoration of normal sleep pattern  10/21/2021 1059 by Janine Aguilar RN  Outcome: Resolved/Met  10/21/2021 1015 by Janine Aguilar RN  Outcome: Progressing Towards Goal  Goal: *LTG: Feel refreshed and energetic during wakeful hours  10/21/2021 1059 by Janine Aguilar RN  Outcome: Resolved/Met  10/21/2021 1015 by Janine Aguilar RN  Outcome: Progressing Towards Goal  Goal: *STG: Follow sleep induction schedule of events  10/21/2021 1059 by Janine Aguilar RN  Outcome: Resolved/Met  10/21/2021 1015 by Janine Aguilar RN  Outcome: Progressing Towards Goal  Goal: Sleep Disturbance Interventions  10/21/2021 1059 by Janine Aguilar RN  Outcome: Resolved/Met  10/21/2021 1015 by Janine Aguilar RN  Outcome: Progressing Towards Goal     Problem: Depressed Mood (Adult/Pediatric)  Goal: *STG: Participates in treatment plan  10/21/2021 1059 by Janine Aguilar RN  Outcome: Resolved/Met  10/21/2021 1015 by Janine Aguilar RN  Outcome: Progressing Towards Goal  Goal: *STG: Participates in 1:1 therapy sessions  10/21/2021 1059 by Kaya Gilbert RN  Outcome: Resolved/Met  10/21/2021 1015 by Kaya Gilbert RN  Outcome: Progressing Towards Goal  Goal: *STG: Verbalizes anger, guilt, and other feelings in a constructive manor  10/21/2021 1059 by Kaya Gilbert, RN  Outcome: Resolved/Met  10/21/2021 1015 by Kaya Gilbert RN  Outcome: Progressing Towards Goal  Goal: *STG: Attends activities and groups  10/21/2021 1059 by Kaya Gilbert, RN  Outcome: Resolved/Met  10/21/2021 1015 by Kaya Gilbert RN  Outcome: Progressing Towards Goal  Goal: *STG: Demonstrates reduction in symptoms and increase in insight into coping skills/future focused  10/21/2021 1059 by Kaya Gilbert, RN  Outcome: Resolved/Met  10/21/2021 1015 by Kaya Gilbert RN  Outcome: Progressing Towards Goal  Goal: *STG: Complies with medication therapy  10/21/2021 1059 by Kaya Gilbert, RN  Outcome: Resolved/Met  10/21/2021 1015 by Kaya Gilbert RN  Outcome: Progressing Towards Goal  Goal: *LTG: Returns to previous level of functioning and participates with after care plan  10/21/2021 1059 by Kaya Gilbert, RN  Outcome: Resolved/Met  10/21/2021 1015 by Kaya Gilbert RN  Outcome: Progressing Towards Goal  Goal: Interventions  10/21/2021 1059 by Kaya Gilbert RN  Outcome: Resolved/Met  10/21/2021 1015 by Kaya Gilbert RN  Outcome: Progressing Towards Goal     Problem: Patient Education: Go to Patient Education Activity  Goal: Patient/Family Education  10/21/2021 1059 by Kaya Gilbert RN  Outcome: Resolved/Met  10/21/2021 1015 by Kaya Gilbert RN  Outcome: Progressing Towards Goal

## 2021-10-21 NOTE — ROUTINE PROCESS
[place] [address] ([phone])             October 21, 2021      To Whom It May Concern: This letter is verification that Silas Mayorga"Nury Gentile was hospitalized at [facility] from 10/19/2021 to 10/21/2021. He may return back to work on 10/22/2021.  If you have any further questions, you may contact me at [phone]      Sincerely,      Leticia Medrano,

## 2021-10-21 NOTE — BH NOTES
Pt A&Ox3 with no s/sx of acute distress noted. Pt denies Pain/SI/HI/AVH at this time. Pt took all meds as ordered and attended group this shift. Pt to discharge to home. He has packed all his personal belongings and signed all paperwork. His personal items from the safe have been retrieved and signed for. Education completed and Pt verbalized understanding of all discharge instructions.  Pt left the unit at 12:03pm

## 2021-10-21 NOTE — DISCHARGE INSTRUCTIONS
Patient Education        Adjustment Disorder: Care Instructions  Your Care Instructions     Adjustment disorder means that you have emotional or behavioral problems because of stress. But your response to the stress is far more severe than a normal response. It is severe enough to affect your work or social life and may cause depression and physical pains and problems. Events that may cause this response can include a divorce, money problems, or starting school or a new job. It might be anything that causes some stress. This disorder is most often a short-term problem. It happens within 3 months of the stressful event or change. If the response lasts longer than 6 months after the event ends, you may have a more serious disorder. Follow-up care is a key part of your treatment and safety. Be sure to make and go to all appointments, and call your doctor if you are having problems. It's also a good idea to know your test results and keep a list of the medicines you take. How can you care for yourself at home? · Go to all counseling sessions. Do not skip any because you are feeling better. · If your doctor prescribed medicines, take them exactly as prescribed. Call your doctor if you think you are having a problem with your medicine. You will get more details on the specific medicines your doctor prescribes. · Discuss the causes of your stress with a good friend or family member. Or you can join a support group for people with similar problems. Talking to others sometimes relieves stress. · Get at least 30 minutes of exercise on most days of the week. Walking is a good choice. You also may want to do other activities, such as running, swimming, cycling, or playing tennis or team sports. Relaxation techniques  Do relaxation exercises 10 to 20 minutes a day. You can play soothing, relaxing music while you do them, if you wish. · Tell others in your house that you are going to do your relaxation exercises.  Ask them not to disturb you. · Find a comfortable, quiet place. · Lie down on your back, or sit with your back straight. · Focus on your breathing. Make it slow and steady. · Breathe in through your nose. Breathe out through either your nose or mouth. · Breathe deeply, filling up the area between your navel and your rib cage. Breathe so that your belly goes up and down. · Do not hold your breath. · Breathe like this for 5 to 10 minutes. Notice the feeling of calmness throughout your whole body. As you continue to breathe slowly and deeply, relax by doing these next steps for another 5 to 10 minutes:  · Tighten and relax each muscle group in your body. Start at your toes, and work your way up to your head. · Imagine your muscle groups relaxing and getting heavy. · Empty your mind of all thoughts. · Let yourself relax more and more deeply. · Be aware of the state of calmness that surrounds you. · When your relaxation time is over, you can bring yourself back to alertness by moving your fingers and toes. Then move your hands and feet. And then move your entire body. Sometimes people fall asleep during relaxation. But they most often wake up soon. · Always give yourself time to return to full alertness before you drive a car. Wait to do anything that might cause an accident if you are not fully alert. Never play a relaxation tape while you drive a car. When should you call for help? Call 911 anytime you think you may need emergency care. For example, call if:    · You feel you cannot stop from hurting yourself or someone else. Keep the numbers for these national suicide hotlines: 2-693-025-TALK (2-742.591.6248) and 5-338-KPXVINM (7-984.520.7711). If you or someone you know talks about suicide or feeling hopeless, get help right away.    Watch closely for changes in your health, and be sure to contact your doctor if:    · You have new anxiety, or your anxiety gets worse.     · You have been feeling sad, depressed, or hopeless or have lost interest in things that you usually enjoy.     · You do not get better as expected. Where can you learn more? Go to http://www.gray.com/  Enter R087 in the search box to learn more about \"Adjustment Disorder: Care Instructions. \"  Current as of: June 16, 2021               Content Version: 13.0  © 2006-2021 [facility]. Care instructions adapted under license by [employer] (which disclaims liability or warranty for this information). If you have questions about a medical condition or this instruction, always ask your healthcare professional. [place] [address] any warranty or liability for your use of this information. Patient Education        Learning About Depression Screening  What is depression screening? Depression screening is a way to see if you have depression symptoms. It may be done by a doctor or counselor. It's often part of a routine checkup. That's because your mental health is just as important as your physical health. Depression is a medical illness that affects how you feel, think, and act. You may:  · Have less energy. · Lose interest in your daily activities. · Feel sad and grouchy for a long time. Depression is very common. It affects men and women of all ages. Many things can trigger depression. Some people become depressed after they have a stroke or find out they have a major illness like cancer or heart disease. The death of a loved one, a breakup, or changes in the natural brain chemicals may lead to depression. It can run in families. Most experts believe that a combination of inherited genes and stressful life events can cause it. What happens during screening? You may be asked to fill out a form about your depression symptoms. You and the doctor will discuss your answers. The doctor may ask you more questions to learn more about how you think, act, and feel.   What happens after screening? If you have signs of depression, your doctor will talk to you about your options. Doctors usually treat depression with medicines or counseling. Often, combining the two works best. Many people don't get help because they think that they'll get over the depression on their own. But people with depression may not get better unless they get treatment. Many people feel embarrassed or ashamed about having depression. But it isn't a sign of personal weakness. It's not a character flaw. A person who is depressed is not \"crazy. \" Depression is caused by changes in the brain. A serious symptom of depression is thinking about death or suicide. If you or someone you care about talks about this or about feeling hopeless, get help right away. It's important to know that depression can be treated. The first step toward feeling better is often just seeing that the problem exists. Where can you learn more? Go to http://www.gray.com/  Enter T185 in the search box to learn more about \"Learning About Depression Screening. \"  Current as of: June 16, 2021               Content Version: 13.0  © 2204-1877 [employer]. Care instructions adapted under license by [employer] (which disclaims liability or warranty for this information). If you have questions about a medical condition or this instruction, always ask your healthcare professional. [place] [address] any warranty or liability for your use of this information. BEHAVIORAL HEALTH NURSING DISCHARGE NOTE      The following personal items collected during your admission are returned to you:   Dental Appliance: Dental Appliances: None  Vision: Visual Aid: None  Hearing Aid:    Jewelry:    Clothing: Clothing: Shorts, Footwear, Undergarments, With patient, Jacket/Coat  Other Valuables:  Other Valuables: Keys, Cell Phone, Money (comment), Other (comment), Wallet ($220 IN CASH)  Valuables sent to safe:        PATIENT INSTRUCTIONS:    What to do at [address] may operate a vehicle for 24-hours. You may engage in an occupation involving machinery or appliances. You may resume normal activities. Avoid making any critical decisions for at least 24-hours. Recommended diet: Regular Diet,    Recommended activity: Activity as tolerated,    You are referred to Alcoholics Anonymous including Tito THOMPSON). Follow-up with [facility] for therapy and your PCP for medication management in a few days. If you have problems relating to your recovery, call your physician. National Suicide Hotline: 4-723.828.9710    [place] Doll: 5-511.533.9745  The discharge information has been reviewed with the patient. The patient verbalized understanding.

## 2021-10-21 NOTE — BH NOTES
Patient had his TDO hearing this morning which he participated in. He was released at the hearing and was discharged home. He sees a therapist weekly via zoom, Franci Medrano. He will see her next week. Also, he plans to reschedule his appointment with Dr. Marcelo Martinez that he missed this week. He prefers to do this so he can work it around his work schedule. He denies SI. He was involved and agreeable in his discharge plan. [facility] transition of care was given to patient to take to his providers.

## 2021-10-21 NOTE — BH NOTES
Jose Alberto Quispe was not out for the [employer]. He was up in room and talking on phone.     Kishan Pagan CNA

## 2021-10-21 NOTE — PROGRESS NOTES
Problem: Falls - Risk of  Goal: *Absence of Falls  Description: Document Randy Byers Fall Risk and appropriate interventions in the flowsheet.   Outcome: Progressing Towards Goal  Note: Fall Risk Interventions:            Medication Interventions: Teach patient to arise slowly                   Problem: Patient Education: Go to Patient Education Activity  Goal: Patient/Family Education  Outcome: Progressing Towards Goal

## 2021-10-21 NOTE — BH NOTES
Affect constricted, mood depressed. Alert and Oriented X 4. Cooperative, guarded, and isolative at times. .Did not attend group. Interacted with staff and peers. Makes needs known. Ate  snacks. Denied SI/HI/AVH and pain. Medication compliant. Stable with no s/s of distress.  Laid in bed with eyes closed for 8 hours and 15 min

## 2021-10-21 NOTE — BH NOTES
Shift change report given to Hans ZimmermanRN re: SBAR, medications, and behavior.   Jeanette fall risk score 1

## 2021-10-22 NOTE — DISCHARGE SUMMARY
[facility] DISCHARGE    Name:  Rich Hanna  MR#:  [id number]  :  1965  ACCOUNT #:  [de-identified]  ADMIT DATE:  10/19/2021  DISCHARGE DATE:  10/21/2021    [facility] DISCHARGE SUMMARY    NOTE:  Greater than 35 minutes was spent in the preparation of this discharge. DISCHARGE DIAGNOSES:  AXIS I:  Adjustment disorder with depressed mood (F43.21). AXIS II:  Deferred - possibly some dependent personality traits. AXIS III:  History of atrial fibrillation; possible obstructive sleep apnea; mitral valve prolapse. AXIS IV:  Stressors are severe (marital separation and wife's alcoholism). AXIS V:  Global Assessment of Functioning at discharge is 70-75. DISCHARGE MEDICATIONS:  Celexa 20 mg daily (new) - - #30 pills with one refill. DISPOSITION/FOLLOW-UP PLANS:  The patient is being discharged in stable condition this morning, on 10/21/2021, after being released at the commitment hearing. He will continue in outpatient therapy with the two therapists that he sees regularly, and he will also continue going to weekly Al-Anon meetings, and this was strongly reinforced with him. HOSPITAL COURSE:  As noted in the admission note, the patient is a 52-year-old, currently , male who has no apparent past psychiatric history, but who was admitted on a TDO from the [facility] due to significant concerns about suicidal thinking and recent behaviors. The details are that he had called his wife or texted her, alerting her that he was having suicidal thoughts and she called the police. When they arrived at his house, they saw that he was walking around in the room holding a gun. They were able to talk him into putting the gun down after 20 minutes, and they noted that he had written out a last will and testament, and seemed to have organized his things as if he was preparing to die.   He was brought to the emergency room, and although he stated that he did all of this to try to get his wife's attention (because she is a psychiatric nurse), enough concerns were raised by the seriousness of what he had done, that a TDO was issued. Once admitted, he was very cooperative, and certainly very open in providing information about his emotional state, the marital dynamics, etc.  He continued to state that he does not think that he would have followed through with it because he really was not thinking that he was going to, but he admitted that all of the actions that he had taken up until the police came certainly raised some serious red flags. However, he was able to work both individually as well as in groups in a therapeutic manner on his issues, stating he was essentially coming to the realization that he was \"addicted\" to his wife and that he viewed the fact that they were intimate sexually as an indicator of them being a healthy, stable couple, when in fact it led him to overlook or tend to minimize the severity of her behavioral issues and particularly how alcohol fuels those problems. While he was here, he was stable and basically euthymic, although certainly there were some moments of dysphoria and low mood given the stressors he was dealing with. However, he had a lot of future oriented planning in place, and seemed to be very invested in getting back to work, and making the commitment to maintain a distance from her. Also while he was here, his wife was constantly calling the nurses, even though he had made it clear that he did not want anyone to speak with her and he did not want to talk to her either. He did talk to her at one point because he felt he needed to coordinate a couple of things, but overall he seemed to have the mindset that going forward, that he would avoid any contact with her, and essentially pursue a divorce, as there was not much indication that his wife's behavior and alcohol dependence were going to change.   He states he did receive a lot of input and insight from multiple staff, and he indicated that the fact that everyone was consistent and how they viewed the dynamics of the relationship led him to begin to fully understand the steps he needed to take going forward, to become no longer dependent on his wife emotionally. We did start a trial on Celexa at 10 mg daily, with the plan to increase it to 20 mg per day after discharge. He has had no side effects with it and was willing to continue with this post discharge. As expected, he was released at the time of the commitment hearing on 10/21, and he indicated he wanted to be discharged so he could return to work, starting tomorrow. We certainly still had concerns about the seriousness of his preparation regarding the suicide attempt/gesture, but he was not discharged AMA at this point, but the importance of following up with outpatient treatment was reinforced with him repeatedly. Additionally, it was reported that his gun had apparently been removed by one of his children, although that could not be completely confirmed. He had no medical or physical issues while he was here and was felt to be medically stable for discharge as well. LABORATORY DATA:  White blood cell count was slightly elevated in the emergency room at 12.8 and glucose was minimally elevated at 104, but other laboratory tests were entirely unremarkable. Urine drug screen was negative and alcohol level was 0. EKG showed a normal QTc interval of 445 milliseconds.       Rema Quesada MD      [id number]  D:  10/21/2021 10:58  T:  10/22/2021 7:02  JOB #:  [id number]

## 2022-03-19 PROBLEM — F43.21 ADJUSTMENT DISORDER WITH DEPRESSED MOOD: Status: ACTIVE | Noted: 2021-10-19

## 2023-05-18 RX ORDER — CITALOPRAM 20 MG/1
20 TABLET ORAL DAILY
COMMUNITY
Start: 2021-10-22

## 2023-08-24 ENCOUNTER — HOSPITAL ENCOUNTER (INPATIENT)
Facility: HOSPITAL | Age: 58
LOS: 2 days | Discharge: HOME | DRG: 519 | End: 2023-08-26
Attending: EMERGENCY MEDICINE | Admitting: FAMILY MEDICINE
Payer: COMMERCIAL

## 2023-08-24 ENCOUNTER — APPOINTMENT (OUTPATIENT)
Facility: HOSPITAL | Age: 58
DRG: 519 | End: 2023-08-24
Payer: COMMERCIAL

## 2023-08-24 DIAGNOSIS — M54.41 ACUTE BILATERAL LOW BACK PAIN WITH BILATERAL SCIATICA: Primary | ICD-10-CM

## 2023-08-24 DIAGNOSIS — M54.42 ACUTE BILATERAL LOW BACK PAIN WITH BILATERAL SCIATICA: Primary | ICD-10-CM

## 2023-08-24 DIAGNOSIS — G83.4 CAUDA EQUINA SYNDROME (HCC): ICD-10-CM

## 2023-08-24 PROBLEM — M50.30 DEGENERATION OF INTERVERTEBRAL DISC OF CERVICAL SPINE WITHOUT DISC HERNIATION: Status: ACTIVE | Noted: 2023-08-24

## 2023-08-24 LAB
ALBUMIN SERPL-MCNC: 4 G/DL (ref 3.5–5)
ALBUMIN/GLOB SERPL: 1.1 (ref 1.1–2.2)
ALP SERPL-CCNC: 62 U/L (ref 45–117)
ALT SERPL-CCNC: 65 U/L (ref 12–78)
ANION GAP SERPL CALC-SCNC: 6 MMOL/L (ref 5–15)
APPEARANCE UR: CLEAR
AST SERPL-CCNC: 50 U/L (ref 15–37)
BACTERIA URNS QL MICRO: NEGATIVE /HPF
BASOPHILS # BLD: 0.1 K/UL (ref 0–0.1)
BASOPHILS NFR BLD: 1 % (ref 0–1)
BILIRUB SERPL-MCNC: 0.3 MG/DL (ref 0.2–1)
BILIRUB UR QL: NEGATIVE
BUN SERPL-MCNC: 18 MG/DL (ref 6–20)
BUN/CREAT SERPL: 21 (ref 12–20)
CALCIUM SERPL-MCNC: 9.2 MG/DL (ref 8.5–10.1)
CHLORIDE SERPL-SCNC: 108 MMOL/L (ref 97–108)
CO2 SERPL-SCNC: 25 MMOL/L (ref 21–32)
COLOR UR: ABNORMAL
COMMENT:: NORMAL
CREAT SERPL-MCNC: 0.85 MG/DL (ref 0.7–1.3)
DIFFERENTIAL METHOD BLD: ABNORMAL
EKG ATRIAL RATE: 126 BPM
EKG DIAGNOSIS: NORMAL
EKG P AXIS: 64 DEGREES
EKG P-R INTERVAL: 134 MS
EKG Q-T INTERVAL: 306 MS
EKG QRS DURATION: 78 MS
EKG QTC CALCULATION (BAZETT): 443 MS
EKG R AXIS: 75 DEGREES
EKG T AXIS: 43 DEGREES
EKG VENTRICULAR RATE: 126 BPM
EOSINOPHIL # BLD: 0.3 K/UL (ref 0–0.4)
EOSINOPHIL NFR BLD: 3 % (ref 0–7)
EPITH CASTS URNS QL MICRO: ABNORMAL /LPF
ERYTHROCYTE [DISTWIDTH] IN BLOOD BY AUTOMATED COUNT: 12 % (ref 11.5–14.5)
GLOBULIN SER CALC-MCNC: 3.7 G/DL (ref 2–4)
GLUCOSE SERPL-MCNC: 95 MG/DL (ref 65–100)
GLUCOSE UR STRIP.AUTO-MCNC: NEGATIVE MG/DL
HCT VFR BLD AUTO: 46.7 % (ref 36.6–50.3)
HGB BLD-MCNC: 15.7 G/DL (ref 12.1–17)
HGB UR QL STRIP: NEGATIVE
HYALINE CASTS URNS QL MICRO: ABNORMAL /LPF (ref 0–5)
IMM GRANULOCYTES # BLD AUTO: 0.1 K/UL (ref 0–0.04)
IMM GRANULOCYTES NFR BLD AUTO: 0 % (ref 0–0.5)
INR PPP: 1 (ref 0.9–1.1)
KETONES UR QL STRIP.AUTO: ABNORMAL MG/DL
LEUKOCYTE ESTERASE UR QL STRIP.AUTO: NEGATIVE
LYMPHOCYTES # BLD: 2 K/UL (ref 0.8–3.5)
LYMPHOCYTES NFR BLD: 17 % (ref 12–49)
MCH RBC QN AUTO: 30 PG (ref 26–34)
MCHC RBC AUTO-ENTMCNC: 33.6 G/DL (ref 30–36.5)
MCV RBC AUTO: 89.1 FL (ref 80–99)
MONOCYTES # BLD: 0.9 K/UL (ref 0–1)
MONOCYTES NFR BLD: 8 % (ref 5–13)
NEUTS SEG # BLD: 8.5 K/UL (ref 1.8–8)
NEUTS SEG NFR BLD: 71 % (ref 32–75)
NITRITE UR QL STRIP.AUTO: NEGATIVE
NRBC # BLD: 0 K/UL (ref 0–0.01)
NRBC BLD-RTO: 0 PER 100 WBC
PH UR STRIP: 7 (ref 5–8)
PLATELET # BLD AUTO: 327 K/UL (ref 150–400)
PMV BLD AUTO: 9.6 FL (ref 8.9–12.9)
POTASSIUM SERPL-SCNC: 4.3 MMOL/L (ref 3.5–5.1)
PROT SERPL-MCNC: 7.7 G/DL (ref 6.4–8.2)
PROT UR STRIP-MCNC: 30 MG/DL
PROTHROMBIN TIME: 10.6 SEC (ref 9–11.1)
RBC # BLD AUTO: 5.24 M/UL (ref 4.1–5.7)
RBC #/AREA URNS HPF: ABNORMAL /HPF (ref 0–5)
SODIUM SERPL-SCNC: 139 MMOL/L (ref 136–145)
SP GR UR REFRACTOMETRY: 1.02 (ref 1–1.03)
SPECIMEN HOLD: NORMAL
URINE CULTURE IF INDICATED: ABNORMAL
UROBILINOGEN UR QL STRIP.AUTO: 1 EU/DL (ref 0.2–1)
WBC # BLD AUTO: 11.8 K/UL (ref 4.1–11.1)
WBC URNS QL MICRO: ABNORMAL /HPF (ref 0–4)

## 2023-08-24 PROCEDURE — 6360000002 HC RX W HCPCS: Performed by: FAMILY MEDICINE

## 2023-08-24 PROCEDURE — 6360000002 HC RX W HCPCS: Performed by: EMERGENCY MEDICINE

## 2023-08-24 PROCEDURE — 6370000000 HC RX 637 (ALT 250 FOR IP): Performed by: PHYSICIAN ASSISTANT

## 2023-08-24 PROCEDURE — 99285 EMERGENCY DEPT VISIT HI MDM: CPT

## 2023-08-24 PROCEDURE — 86901 BLOOD TYPING SEROLOGIC RH(D): CPT

## 2023-08-24 PROCEDURE — 36415 COLL VENOUS BLD VENIPUNCTURE: CPT

## 2023-08-24 PROCEDURE — 86900 BLOOD TYPING SEROLOGIC ABO: CPT

## 2023-08-24 PROCEDURE — 99024 POSTOP FOLLOW-UP VISIT: CPT | Performed by: PHYSICIAN ASSISTANT

## 2023-08-24 PROCEDURE — 81001 URINALYSIS AUTO W/SCOPE: CPT

## 2023-08-24 PROCEDURE — 85025 COMPLETE CBC W/AUTO DIFF WBC: CPT

## 2023-08-24 PROCEDURE — 80053 COMPREHEN METABOLIC PANEL: CPT

## 2023-08-24 PROCEDURE — 93005 ELECTROCARDIOGRAM TRACING: CPT | Performed by: EMERGENCY MEDICINE

## 2023-08-24 PROCEDURE — 85610 PROTHROMBIN TIME: CPT

## 2023-08-24 PROCEDURE — 86850 RBC ANTIBODY SCREEN: CPT

## 2023-08-24 PROCEDURE — 71045 X-RAY EXAM CHEST 1 VIEW: CPT

## 2023-08-24 PROCEDURE — 51798 US URINE CAPACITY MEASURE: CPT

## 2023-08-24 PROCEDURE — 1100000000 HC RM PRIVATE

## 2023-08-24 RX ORDER — OXYCODONE HYDROCHLORIDE 5 MG/1
5 TABLET ORAL EVERY 4 HOURS PRN
Status: DISCONTINUED | OUTPATIENT
Start: 2023-08-24 | End: 2023-08-26 | Stop reason: HOSPADM

## 2023-08-24 RX ORDER — DEXAMETHASONE 4 MG/1
10 TABLET ORAL EVERY 6 HOURS SCHEDULED
Status: DISCONTINUED | OUTPATIENT
Start: 2023-08-24 | End: 2023-08-24

## 2023-08-24 RX ORDER — KETOROLAC TROMETHAMINE 30 MG/ML
15 INJECTION, SOLUTION INTRAMUSCULAR; INTRAVENOUS ONCE
Status: COMPLETED | OUTPATIENT
Start: 2023-08-24 | End: 2023-08-24

## 2023-08-24 RX ORDER — CYCLOBENZAPRINE HCL 10 MG
5 TABLET ORAL 3 TIMES DAILY
Status: DISCONTINUED | OUTPATIENT
Start: 2023-08-24 | End: 2023-08-26 | Stop reason: HOSPADM

## 2023-08-24 RX ORDER — DEXAMETHASONE SODIUM PHOSPHATE 10 MG/ML
10 INJECTION, SOLUTION INTRAMUSCULAR; INTRAVENOUS ONCE
Status: COMPLETED | OUTPATIENT
Start: 2023-08-24 | End: 2023-08-24

## 2023-08-24 RX ORDER — ACETAMINOPHEN 325 MG/1
650 TABLET ORAL EVERY 6 HOURS SCHEDULED
Status: DISCONTINUED | OUTPATIENT
Start: 2023-08-24 | End: 2023-08-26 | Stop reason: HOSPADM

## 2023-08-24 RX ADMIN — DEXAMETHASONE SODIUM PHOSPHATE 10 MG: 10 INJECTION INTRAMUSCULAR; INTRAVENOUS at 18:08

## 2023-08-24 RX ADMIN — OXYCODONE HYDROCHLORIDE 5 MG: 5 TABLET ORAL at 18:08

## 2023-08-24 RX ADMIN — OXYCODONE HYDROCHLORIDE 5 MG: 5 TABLET ORAL at 23:05

## 2023-08-24 RX ADMIN — DEXAMETHASONE 10 MG: 4 TABLET ORAL at 23:05

## 2023-08-24 RX ADMIN — KETOROLAC TROMETHAMINE 15 MG: 30 INJECTION, SOLUTION INTRAMUSCULAR; INTRAVENOUS at 18:08

## 2023-08-24 RX ADMIN — ACETAMINOPHEN 650 MG: 325 TABLET ORAL at 23:05

## 2023-08-24 RX ADMIN — CYCLOBENZAPRINE 5 MG: 10 TABLET, FILM COATED ORAL at 22:39

## 2023-08-24 RX ADMIN — ACETAMINOPHEN 650 MG: 325 TABLET ORAL at 18:08

## 2023-08-24 ASSESSMENT — PAIN SCALES - GENERAL
PAINLEVEL_OUTOF10: 3
PAINLEVEL_OUTOF10: 0
PAINLEVEL_OUTOF10: 10

## 2023-08-24 ASSESSMENT — PAIN DESCRIPTION - PAIN TYPE: TYPE: ACUTE PAIN

## 2023-08-24 ASSESSMENT — PAIN DESCRIPTION - ORIENTATION
ORIENTATION: LOWER
ORIENTATION: LOWER

## 2023-08-24 ASSESSMENT — PAIN DESCRIPTION - LOCATION
LOCATION: BACK
LOCATION: BACK

## 2023-08-24 ASSESSMENT — PAIN - FUNCTIONAL ASSESSMENT
PAIN_FUNCTIONAL_ASSESSMENT: 0-10
PAIN_FUNCTIONAL_ASSESSMENT: 0-10

## 2023-08-24 NOTE — H&P
History and Physical    Date of Service:  8/24/2023  Primary Care Provider: No primary care provider on file. Source of information: The patient and Chart review    Chief Complaint: Back Pain      History of Presenting Illness:   Johanna Preston is a 63 y.o. male who was sent from neurosurgery's office with concern of lumbar disks disease and lower extremity weakness. Patient has been having intractable back pain since last week the pain came from the back, radiates to anterior thigh, having difficulty getting out of bed in the morning due to weakness in the legs. Patient was prescribed Neurontin and muscle relaxer but pain has not really improved. Patient was evaluated in Dr Rose Marie Mahan office today. MRI shows L3-L4 disc bulge and there was some concern of cauda equina syndrome per signout. Patient was subsequently sent to the emergency room for further evaluation. Patient any issue with bladder or bowel control. Able to walk some but apparently has had some weakness of the lower extremities. Neurosurgery has been consulted from the ER. Hospitalist service requested admit the patient for further evaluation management. The patient denies any headache, blurry vision, sore throat, trouble swallowing, trouble with speech, chest pain, SOB, cough, fever, chills, N/V/D, abd pain, urinary symptoms, constipation, recent travels, sick contacts, focal or generalized neurological symptoms, falls, injuries, rashes, contact with COVID-19 diagnosed patients, hematemesis, melena, hemoptysis, hematuria, rashes, denies starting any new medications and denies any other concerns or problems besides as mentioned above. REVIEW OF SYSTEMS:  A comprehensive review of systems was negative except for that written in the History of Present Illness. Past Medical History:   Diagnosis Date    Chest pain     Mitral regurgitation     Myxomatous mitral valve       No past surgical history on file.   Prior to

## 2023-08-24 NOTE — PROGRESS NOTES
Neurosurgery    Pt seen and examined. Full consult note to follow. Pt with lumbar spine pain; probable herniated disc. 5 days history of worsening pain, weakness, falls, and difficulty urinating. Pt had similar presenting symptoms 9 years ago when he underwent microdiscectomy by Dr. Angel Shay. Pt presents with an MRI on a disc. Bilateral LE weakness; right worse than left. ?urinary incontinence. Please admit to medicine. Admit to 5W. Pain control. Needs post void bladder scan; insert pritchett for retention. Medical mgmt per hospitalist. Pt will likely need surgery during this admission. We will look at OR schedule. NPO after midnight just in case. No anticoagulation.   Discussed with Dr. Angel Shay

## 2023-08-24 NOTE — ED PROVIDER NOTES
[facility] EMERGENCY DEP  EMERGENCY DEPARTMENT ENCOUNTER      Pt Name: Mort Gowers  MRN: [id number]  [address] 1965  Date of evaluation: 8/24/2023  Provider: Lashaun Valadez MD      HISTORY OF PRESENT ILLNESS      HPI  56-year-old man presenting to the emergency department from his neurosurgeons office Dr. Sophie Purvis for back pain and admission. Since last weekend patient has been having intractable lower back pain. He says the pain progressed the pain down his anterior thighs to the point where he could not get out of bed in the morning. He followed up as an outpatient has been started on gabapentin and a muscle relaxer which does help him walk but he had an MRI that showed an L3/L4 disc bulge reportedly. He was sent to the emergency department for admission and surgical intervention. There is report that he was having urinary retention but he denies this, but says this has been an issue for him during a previous issue with his back. Nursing Notes were reviewed. REVIEW OF SYSTEMS         Review of Systems  A complete review of systems was performed and all systems reviewed are negative less otherwise document in the HPI      PAST MEDICAL HISTORY     Past Medical History:   Diagnosis Date    Chest pain     Mitral regurgitation     Myxomatous mitral valve          SURGICAL HISTORY     No past surgical history on file. CURRENT MEDICATIONS       Previous Medications    CITALOPRAM (CELEXA) 20 MG TABLET    Take 1 tablet by mouth daily       ALLERGIES     Patient has no known allergies.     FAMILY HISTORY       Family History   Problem Relation Age of Onset    Diabetes Maternal Grandfather     Heart Disease Neg Hx     Diabetes Maternal Grandmother     Hypertension Father           SOCIAL HISTORY       Social History     Socioeconomic History    Marital status:    Tobacco Use    Smoking status: Former     Packs/day: 1.00     Types: Cigarettes     Quit date: 2/1/2008     Years since quitting:

## 2023-08-25 ENCOUNTER — ANESTHESIA EVENT (OUTPATIENT)
Facility: HOSPITAL | Age: 58
End: 2023-08-25
Payer: COMMERCIAL

## 2023-08-25 ENCOUNTER — APPOINTMENT (OUTPATIENT)
Facility: HOSPITAL | Age: 58
DRG: 519 | End: 2023-08-25
Payer: COMMERCIAL

## 2023-08-25 ENCOUNTER — ANESTHESIA (OUTPATIENT)
Facility: HOSPITAL | Age: 58
End: 2023-08-25
Payer: COMMERCIAL

## 2023-08-25 LAB
ABO + RH BLD: NORMAL
ALBUMIN SERPL-MCNC: 4 G/DL (ref 3.5–5)
ALBUMIN/GLOB SERPL: 1.2 (ref 1.1–2.2)
ALP SERPL-CCNC: 52 U/L (ref 45–117)
ALT SERPL-CCNC: 64 U/L (ref 12–78)
ANION GAP SERPL CALC-SCNC: 7 MMOL/L (ref 5–15)
AST SERPL-CCNC: 42 U/L (ref 15–37)
BASOPHILS # BLD: 0 K/UL (ref 0–0.1)
BASOPHILS NFR BLD: 0 % (ref 0–1)
BILIRUB SERPL-MCNC: 0.6 MG/DL (ref 0.2–1)
BLOOD GROUP ANTIBODIES SERPL: NORMAL
BUN SERPL-MCNC: 17 MG/DL (ref 6–20)
BUN/CREAT SERPL: 22 (ref 12–20)
CALCIUM SERPL-MCNC: 9.5 MG/DL (ref 8.5–10.1)
CHLORIDE SERPL-SCNC: 107 MMOL/L (ref 97–108)
CO2 SERPL-SCNC: 24 MMOL/L (ref 21–32)
CREAT SERPL-MCNC: 0.77 MG/DL (ref 0.7–1.3)
DIFFERENTIAL METHOD BLD: ABNORMAL
EOSINOPHIL # BLD: 0 K/UL (ref 0–0.4)
EOSINOPHIL NFR BLD: 0 % (ref 0–7)
ERYTHROCYTE [DISTWIDTH] IN BLOOD BY AUTOMATED COUNT: 11.9 % (ref 11.5–14.5)
GLOBULIN SER CALC-MCNC: 3.4 G/DL (ref 2–4)
GLUCOSE SERPL-MCNC: 113 MG/DL (ref 65–100)
HCT VFR BLD AUTO: 51.3 % (ref 36.6–50.3)
HGB BLD-MCNC: 17 G/DL (ref 12.1–17)
IMM GRANULOCYTES # BLD AUTO: 0 K/UL (ref 0–0.04)
IMM GRANULOCYTES NFR BLD AUTO: 0 % (ref 0–0.5)
LYMPHOCYTES # BLD: 0.7 K/UL (ref 0.8–3.5)
LYMPHOCYTES NFR BLD: 7 % (ref 12–49)
MCH RBC QN AUTO: 29.6 PG (ref 26–34)
MCHC RBC AUTO-ENTMCNC: 33.1 G/DL (ref 30–36.5)
MCV RBC AUTO: 89.2 FL (ref 80–99)
MONOCYTES # BLD: 0.1 K/UL (ref 0–1)
MONOCYTES NFR BLD: 1 % (ref 5–13)
NEUTS SEG # BLD: 9.1 K/UL (ref 1.8–8)
NEUTS SEG NFR BLD: 92 % (ref 32–75)
NRBC # BLD: 0 K/UL (ref 0–0.01)
NRBC BLD-RTO: 0 PER 100 WBC
PLATELET # BLD AUTO: 306 K/UL (ref 150–400)
PMV BLD AUTO: 9.6 FL (ref 8.9–12.9)
POTASSIUM SERPL-SCNC: 4.7 MMOL/L (ref 3.5–5.1)
PROT SERPL-MCNC: 7.4 G/DL (ref 6.4–8.2)
RBC # BLD AUTO: 5.75 M/UL (ref 4.1–5.7)
RBC MORPH BLD: ABNORMAL
SODIUM SERPL-SCNC: 138 MMOL/L (ref 136–145)
SPECIMEN EXP DATE BLD: NORMAL
WBC # BLD AUTO: 9.9 K/UL (ref 4.1–11.1)

## 2023-08-25 PROCEDURE — 1100000000 HC RM PRIVATE

## 2023-08-25 PROCEDURE — 2709999900 HC NON-CHARGEABLE SUPPLY: Performed by: NEUROLOGICAL SURGERY

## 2023-08-25 PROCEDURE — 3700000001 HC ADD 15 MINUTES (ANESTHESIA): Performed by: NEUROLOGICAL SURGERY

## 2023-08-25 PROCEDURE — 6370000000 HC RX 637 (ALT 250 FOR IP): Performed by: PHYSICIAN ASSISTANT

## 2023-08-25 PROCEDURE — 2580000003 HC RX 258: Performed by: FAMILY MEDICINE

## 2023-08-25 PROCEDURE — 7100000001 HC PACU RECOVERY - ADDTL 15 MIN: Performed by: NEUROLOGICAL SURGERY

## 2023-08-25 PROCEDURE — 2580000003 HC RX 258: Performed by: ANESTHESIOLOGY

## 2023-08-25 PROCEDURE — 2500000003 HC RX 250 WO HCPCS: Performed by: NURSE ANESTHETIST, CERTIFIED REGISTERED

## 2023-08-25 PROCEDURE — 36415 COLL VENOUS BLD VENIPUNCTURE: CPT

## 2023-08-25 PROCEDURE — 6370000000 HC RX 637 (ALT 250 FOR IP): Performed by: NEUROLOGICAL SURGERY

## 2023-08-25 PROCEDURE — 3600000014 HC SURGERY LEVEL 4 ADDTL 15MIN: Performed by: NEUROLOGICAL SURGERY

## 2023-08-25 PROCEDURE — 3700000000 HC ANESTHESIA ATTENDED CARE: Performed by: NEUROLOGICAL SURGERY

## 2023-08-25 PROCEDURE — C9399 UNCLASSIFIED DRUGS OR BIOLOG: HCPCS | Performed by: NURSE ANESTHETIST, CERTIFIED REGISTERED

## 2023-08-25 PROCEDURE — 6360000002 HC RX W HCPCS: Performed by: NURSE ANESTHETIST, CERTIFIED REGISTERED

## 2023-08-25 PROCEDURE — 0SB20ZZ EXCISION OF LUMBAR VERTEBRAL DISC, OPEN APPROACH: ICD-10-PCS | Performed by: NEUROLOGICAL SURGERY

## 2023-08-25 PROCEDURE — 7100000000 HC PACU RECOVERY - FIRST 15 MIN: Performed by: NEUROLOGICAL SURGERY

## 2023-08-25 PROCEDURE — C1713 ANCHOR/SCREW BN/BN,TIS/BN: HCPCS | Performed by: NEUROLOGICAL SURGERY

## 2023-08-25 PROCEDURE — 85025 COMPLETE CBC W/AUTO DIFF WBC: CPT

## 2023-08-25 PROCEDURE — 3600000004 HC SURGERY LEVEL 4 BASE: Performed by: NEUROLOGICAL SURGERY

## 2023-08-25 PROCEDURE — 80053 COMPREHEN METABOLIC PANEL: CPT

## 2023-08-25 PROCEDURE — 6360000002 HC RX W HCPCS: Performed by: NEUROLOGICAL SURGERY

## 2023-08-25 PROCEDURE — 6360000002 HC RX W HCPCS: Performed by: FAMILY MEDICINE

## 2023-08-25 PROCEDURE — 51798 US URINE CAPACITY MEASURE: CPT

## 2023-08-25 PROCEDURE — 2580000003 HC RX 258: Performed by: NEUROLOGICAL SURGERY

## 2023-08-25 PROCEDURE — 2720000010 HC SURG SUPPLY STERILE: Performed by: NEUROLOGICAL SURGERY

## 2023-08-25 PROCEDURE — 88305 TISSUE EXAM BY PATHOLOGIST: CPT

## 2023-08-25 RX ORDER — SODIUM CHLORIDE, SODIUM LACTATE, POTASSIUM CHLORIDE, CALCIUM CHLORIDE 600; 310; 30; 20 MG/100ML; MG/100ML; MG/100ML; MG/100ML
INJECTION, SOLUTION INTRAVENOUS CONTINUOUS
Status: DISCONTINUED | OUTPATIENT
Start: 2023-08-25 | End: 2023-08-25

## 2023-08-25 RX ORDER — BUPIVACAINE HYDROCHLORIDE 5 MG/ML
INJECTION, SOLUTION EPIDURAL; INTRACAUDAL PRN
Status: DISCONTINUED | OUTPATIENT
Start: 2023-08-25 | End: 2023-08-25 | Stop reason: HOSPADM

## 2023-08-25 RX ORDER — SODIUM CHLORIDE 9 MG/ML
INJECTION, SOLUTION INTRAVENOUS CONTINUOUS
Status: DISCONTINUED | OUTPATIENT
Start: 2023-08-25 | End: 2023-08-25

## 2023-08-25 RX ORDER — KETOROLAC TROMETHAMINE 30 MG/ML
INJECTION, SOLUTION INTRAMUSCULAR; INTRAVENOUS PRN
Status: DISCONTINUED | OUTPATIENT
Start: 2023-08-25 | End: 2023-08-25 | Stop reason: SDUPTHER

## 2023-08-25 RX ORDER — SODIUM CHLORIDE, SODIUM LACTATE, POTASSIUM CHLORIDE, CALCIUM CHLORIDE 600; 310; 30; 20 MG/100ML; MG/100ML; MG/100ML; MG/100ML
INJECTION, SOLUTION INTRAVENOUS CONTINUOUS
Status: DISCONTINUED | OUTPATIENT
Start: 2023-08-25 | End: 2023-08-26 | Stop reason: HOSPADM

## 2023-08-25 RX ORDER — ONDANSETRON 2 MG/ML
4 INJECTION INTRAMUSCULAR; INTRAVENOUS EVERY 6 HOURS PRN
Status: DISCONTINUED | OUTPATIENT
Start: 2023-08-25 | End: 2023-08-26 | Stop reason: HOSPADM

## 2023-08-25 RX ORDER — ONDANSETRON 2 MG/ML
INJECTION INTRAMUSCULAR; INTRAVENOUS PRN
Status: DISCONTINUED | OUTPATIENT
Start: 2023-08-25 | End: 2023-08-25 | Stop reason: SDUPTHER

## 2023-08-25 RX ORDER — LIDOCAINE HYDROCHLORIDE 20 MG/ML
INJECTION, SOLUTION EPIDURAL; INFILTRATION; INTRACAUDAL; PERINEURAL PRN
Status: DISCONTINUED | OUTPATIENT
Start: 2023-08-25 | End: 2023-08-25 | Stop reason: SDUPTHER

## 2023-08-25 RX ORDER — SODIUM CHLORIDE 9 MG/ML
INJECTION, SOLUTION INTRAVENOUS PRN
Status: DISCONTINUED | OUTPATIENT
Start: 2023-08-25 | End: 2023-08-26 | Stop reason: HOSPADM

## 2023-08-25 RX ORDER — DEXAMETHASONE SODIUM PHOSPHATE 4 MG/ML
INJECTION, SOLUTION INTRA-ARTICULAR; INTRALESIONAL; INTRAMUSCULAR; INTRAVENOUS; SOFT TISSUE PRN
Status: DISCONTINUED | OUTPATIENT
Start: 2023-08-25 | End: 2023-08-25

## 2023-08-25 RX ORDER — SODIUM CHLORIDE 0.9 % (FLUSH) 0.9 %
5-40 SYRINGE (ML) INJECTION PRN
Status: DISCONTINUED | OUTPATIENT
Start: 2023-08-25 | End: 2023-08-26 | Stop reason: HOSPADM

## 2023-08-25 RX ORDER — CITALOPRAM 20 MG/1
20 TABLET ORAL DAILY
Status: DISCONTINUED | OUTPATIENT
Start: 2023-08-25 | End: 2023-08-26 | Stop reason: HOSPADM

## 2023-08-25 RX ORDER — CEFAZOLIN SODIUM 1 G/3ML
INJECTION, POWDER, FOR SOLUTION INTRAMUSCULAR; INTRAVENOUS PRN
Status: DISCONTINUED | OUTPATIENT
Start: 2023-08-25 | End: 2023-08-25 | Stop reason: SDUPTHER

## 2023-08-25 RX ORDER — FENTANYL CITRATE 50 UG/ML
INJECTION, SOLUTION INTRAMUSCULAR; INTRAVENOUS PRN
Status: DISCONTINUED | OUTPATIENT
Start: 2023-08-25 | End: 2023-08-25 | Stop reason: SDUPTHER

## 2023-08-25 RX ORDER — ROCURONIUM BROMIDE 10 MG/ML
INJECTION, SOLUTION INTRAVENOUS PRN
Status: DISCONTINUED | OUTPATIENT
Start: 2023-08-25 | End: 2023-08-25 | Stop reason: SDUPTHER

## 2023-08-25 RX ORDER — KETOROLAC TROMETHAMINE 30 MG/ML
30 INJECTION, SOLUTION INTRAMUSCULAR; INTRAVENOUS EVERY 6 HOURS
Status: DISCONTINUED | OUTPATIENT
Start: 2023-08-25 | End: 2023-08-26 | Stop reason: HOSPADM

## 2023-08-25 RX ORDER — PHENYLEPHRINE HCL IN 0.9% NACL 0.4MG/10ML
SYRINGE (ML) INTRAVENOUS PRN
Status: DISCONTINUED | OUTPATIENT
Start: 2023-08-25 | End: 2023-08-25

## 2023-08-25 RX ORDER — POLYETHYLENE GLYCOL 3350 17 G/17G
17 POWDER, FOR SOLUTION ORAL DAILY PRN
Status: DISCONTINUED | OUTPATIENT
Start: 2023-08-25 | End: 2023-08-26 | Stop reason: HOSPADM

## 2023-08-25 RX ORDER — KETAMINE HCL IN NACL, ISO-OSM 100MG/10ML
SYRINGE (ML) INJECTION PRN
Status: DISCONTINUED | OUTPATIENT
Start: 2023-08-25 | End: 2023-08-25 | Stop reason: SDUPTHER

## 2023-08-25 RX ORDER — PHENYLEPHRINE HCL IN 0.9% NACL 0.4MG/10ML
SYRINGE (ML) INTRAVENOUS PRN
Status: DISCONTINUED | OUTPATIENT
Start: 2023-08-25 | End: 2023-08-25 | Stop reason: SDUPTHER

## 2023-08-25 RX ORDER — ACETAMINOPHEN 650 MG/1
650 SUPPOSITORY RECTAL EVERY 6 HOURS PRN
Status: DISCONTINUED | OUTPATIENT
Start: 2023-08-25 | End: 2023-08-26 | Stop reason: HOSPADM

## 2023-08-25 RX ORDER — HYDROMORPHONE HYDROCHLORIDE 2 MG/ML
INJECTION, SOLUTION INTRAMUSCULAR; INTRAVENOUS; SUBCUTANEOUS PRN
Status: DISCONTINUED | OUTPATIENT
Start: 2023-08-25 | End: 2023-08-25 | Stop reason: SDUPTHER

## 2023-08-25 RX ORDER — HYDROMORPHONE HYDROCHLORIDE 1 MG/ML
1 INJECTION, SOLUTION INTRAMUSCULAR; INTRAVENOUS; SUBCUTANEOUS
Status: DISCONTINUED | OUTPATIENT
Start: 2023-08-25 | End: 2023-08-26 | Stop reason: HOSPADM

## 2023-08-25 RX ORDER — ONDANSETRON 4 MG/1
4 TABLET, ORALLY DISINTEGRATING ORAL EVERY 8 HOURS PRN
Status: DISCONTINUED | OUTPATIENT
Start: 2023-08-25 | End: 2023-08-26 | Stop reason: HOSPADM

## 2023-08-25 RX ORDER — MIDAZOLAM HYDROCHLORIDE 1 MG/ML
INJECTION INTRAMUSCULAR; INTRAVENOUS PRN
Status: DISCONTINUED | OUTPATIENT
Start: 2023-08-25 | End: 2023-08-25 | Stop reason: SDUPTHER

## 2023-08-25 RX ORDER — SODIUM CHLORIDE 0.9 % (FLUSH) 0.9 %
5-40 SYRINGE (ML) INJECTION EVERY 12 HOURS SCHEDULED
Status: DISCONTINUED | OUTPATIENT
Start: 2023-08-25 | End: 2023-08-26 | Stop reason: HOSPADM

## 2023-08-25 RX ORDER — ACETAMINOPHEN 325 MG/1
650 TABLET ORAL EVERY 6 HOURS PRN
Status: DISCONTINUED | OUTPATIENT
Start: 2023-08-25 | End: 2023-08-26 | Stop reason: HOSPADM

## 2023-08-25 RX ADMIN — KETOROLAC TROMETHAMINE 30 MG: 30 INJECTION, SOLUTION INTRAMUSCULAR; INTRAVENOUS at 19:33

## 2023-08-25 RX ADMIN — ROCURONIUM BROMIDE 10 MG: 10 SOLUTION INTRAVENOUS at 13:00

## 2023-08-25 RX ADMIN — Medication 40 MCG: at 12:44

## 2023-08-25 RX ADMIN — WATER 2000 MG: 1 INJECTION INTRAMUSCULAR; INTRAVENOUS; SUBCUTANEOUS at 19:33

## 2023-08-25 RX ADMIN — LIDOCAINE HYDROCHLORIDE 100 MG: 20 INJECTION, SOLUTION EPIDURAL; INFILTRATION; INTRACAUDAL; PERINEURAL at 12:05

## 2023-08-25 RX ADMIN — ONDANSETRON HYDROCHLORIDE 4 MG: 2 INJECTION, SOLUTION INTRAMUSCULAR; INTRAVENOUS at 13:10

## 2023-08-25 RX ADMIN — FENTANYL CITRATE 50 MCG: 50 INJECTION, SOLUTION INTRAMUSCULAR; INTRAVENOUS at 12:27

## 2023-08-25 RX ADMIN — SODIUM CHLORIDE, PRESERVATIVE FREE 10 ML: 5 INJECTION INTRAVENOUS at 22:41

## 2023-08-25 RX ADMIN — PROPOFOL 25 MG: 10 INJECTION, EMULSION INTRAVENOUS at 13:23

## 2023-08-25 RX ADMIN — Medication 30 MG: at 12:15

## 2023-08-25 RX ADMIN — SODIUM CHLORIDE, POTASSIUM CHLORIDE, SODIUM LACTATE AND CALCIUM CHLORIDE: 600; 310; 30; 20 INJECTION, SOLUTION INTRAVENOUS at 10:34

## 2023-08-25 RX ADMIN — DEXAMETHASONE 10 MG: 4 TABLET ORAL at 06:19

## 2023-08-25 RX ADMIN — HYDROMORPHONE HYDROCHLORIDE 1 MG: 2 INJECTION, SOLUTION INTRAMUSCULAR; INTRAVENOUS; SUBCUTANEOUS at 13:41

## 2023-08-25 RX ADMIN — PROPOFOL 150 MG: 10 INJECTION, EMULSION INTRAVENOUS at 12:05

## 2023-08-25 RX ADMIN — FENTANYL CITRATE 50 MCG: 50 INJECTION, SOLUTION INTRAMUSCULAR; INTRAVENOUS at 12:05

## 2023-08-25 RX ADMIN — CYCLOBENZAPRINE 5 MG: 10 TABLET, FILM COATED ORAL at 15:22

## 2023-08-25 RX ADMIN — Medication 40 MCG: at 13:06

## 2023-08-25 RX ADMIN — Medication 80 MCG: at 13:03

## 2023-08-25 RX ADMIN — SODIUM CHLORIDE: 9 INJECTION, SOLUTION INTRAVENOUS at 15:02

## 2023-08-25 RX ADMIN — CYCLOBENZAPRINE 5 MG: 10 TABLET, FILM COATED ORAL at 22:40

## 2023-08-25 RX ADMIN — PROPOFOL 25 MG: 10 INJECTION, EMULSION INTRAVENOUS at 13:18

## 2023-08-25 RX ADMIN — ACETAMINOPHEN 650 MG: 325 TABLET ORAL at 19:34

## 2023-08-25 RX ADMIN — DEXAMETHASONE 10 MG: 4 TABLET ORAL at 13:00

## 2023-08-25 RX ADMIN — KETOROLAC TROMETHAMINE 30 MG: 30 INJECTION, SOLUTION INTRAMUSCULAR at 13:41

## 2023-08-25 RX ADMIN — MIDAZOLAM 5 MG: 1 INJECTION INTRAMUSCULAR; INTRAVENOUS at 11:57

## 2023-08-25 RX ADMIN — CITALOPRAM HYDROBROMIDE 20 MG: 20 TABLET ORAL at 15:22

## 2023-08-25 RX ADMIN — CEFAZOLIN 2 G: 330 INJECTION, POWDER, FOR SOLUTION INTRAMUSCULAR; INTRAVENOUS at 12:15

## 2023-08-25 RX ADMIN — OXYCODONE HYDROCHLORIDE 5 MG: 5 TABLET ORAL at 15:22

## 2023-08-25 RX ADMIN — ROCURONIUM BROMIDE 40 MG: 10 SOLUTION INTRAVENOUS at 12:05

## 2023-08-25 RX ADMIN — ROCURONIUM BROMIDE 20 MG: 10 SOLUTION INTRAVENOUS at 12:27

## 2023-08-25 RX ADMIN — ACETAMINOPHEN 650 MG: 325 TABLET ORAL at 06:19

## 2023-08-25 RX ADMIN — SUGAMMADEX 200 MG: 100 INJECTION, SOLUTION INTRAVENOUS at 13:23

## 2023-08-25 ASSESSMENT — PAIN - FUNCTIONAL ASSESSMENT: PAIN_FUNCTIONAL_ASSESSMENT: NONE - DENIES PAIN

## 2023-08-25 ASSESSMENT — PAIN SCALES - GENERAL: PAINLEVEL_OUTOF10: 0

## 2023-08-25 NOTE — PROGRESS NOTES
[facility]  Hospitalist Group                                                                                          Hospitalist Progress Note  HONG Irizarry NP  Answering service: [phone] from in house phone        Date of Service:  2023  NAME:  Mort Gowers  :  1965  MRN:  [id number]       Admission Summary:   Mort Gowers is a 63 y.o. male who was sent from neurosurgery's office with concern of lumbar disks disease and lower extremity weakness. Patient has been having intractable back pain since last week the pain came from the back, radiates to anterior thigh, having difficulty getting out of bed in the morning due to weakness in the legs. Patient was prescribed Neurontin and muscle relaxer but pain has not really improved. Patient was evaluated in Dr Ita Giang office today. MRI shows L3-L4 disc bulge and there was some concern of cauda equina syndrome per signout. Patient was subsequently sent to the emergency room for further evaluation. Patient any issue with bladder or bowel control. Able to walk some but apparently has had some weakness of the lower extremities. Neurosurgery has been consulted from the ER. Hospitalist service requested admit the patient for further evaluation management. Interval history / Subjective:   Patient seen after returning to room from OR. Patient reports feeling well, although still feels that he is waking up from anesthesia. Endorses back pain, just recently received pain medication. Patient needs to be evaluated by PT tomorrow, then likely can be discharged.    Assessment & Plan:     Lumbar disc herniation, post-op day #0 L2-L3 laminectomy resection of mass  -L3-L4 disc herniation noted on MRI, evaluated in neurosurgery's office on  with some concern of cauda equina syndrome, so sent to ED  -Neurosurgery following: post-op day #0 L2-L3 laminectomy resection of mass  -PRN oxycodone and

## 2023-08-25 NOTE — PROGRESS NOTES
Post-op: Pt ambulating (with significantly less pain) and voiding with no retention (bladder scanned for 30 mL post-void).

## 2023-08-25 NOTE — FLOWSHEET NOTE
08/25/23 1231   Family Communication    Relationship to Patient Friend    Phone Number Phillip Milner (Girlfriend)   Family/Significant Other Update Called   Delivery Origin Nurse   Message Disposition Family present - message delivered   Family Communication   Family Update Message Procedure started

## 2023-08-25 NOTE — ACP (ADVANCE CARE PLANNING)
Advance Care Planning     Advance Care Planning Inpatient Note  Spiritual Care Department    Today's Date: 8/25/2023  Unit: [facility] SURGERY    Received request from admission screening. Upon review of chart and communication with care team, patient's decision making abilities are not in question. . Patient was/were present in the room during visit. Goals of ACP Conversation:  Discuss advance care planning documents  Facilitate a discussion related to patient's goals of care as they align with the patient's values and beliefs. Health Care Decision Makers:       Primary Decision Maker: Zen Main - Girlfriend - [phone]    Secondary Decision Maker: Freda Tsang - Child - [phone]  Summary:  Completed [address]  Documented Next of Kin, per patient report    Advance Care Planning Documents (Patient Wishes):  Healthcare Power of /Advance Directive Appointment of Health Care Agent  Living Will/Advance Directive     Assessment:  'Lakesha Terrazas' desired to have an Advance Directive completed prior to his surgery. He name is significant other Zen Main ans his Primary HCPOA and his son, Freda Tsang as his Secondary HCPOA. Lakesha Terrazas is a Jew, he shared that he attends several different [employer] community services - no spiritual needs noted. He is currently going through some personal family issues. He is  and has two children in college. Karan verbally expressed his appreciation for support today and he share that he is open to spiritual support after today's surgery - he will be admitted.      Interventions:  Provided education on documents for clarity and greater understanding  Discussed and provided education on state decision maker hierarchy  Assisted in the completion of documents according to patient's wishes at this time  Encouraged ongoing ACP conversation with future decision makers and loved ones    Care Preferences

## 2023-08-25 NOTE — BRIEF OP NOTE
Brief Postoperative Note      Patient: Johanna Preston  YOB: 1965  MRN: [id number]    Date of Procedure: 8/25/2023    Pre-Op Diagnosis Codes:     * Lumbar epidural mass ([address]) [G95.89]    Post-Op Diagnosis: Same       Procedure(s):  L2-3 LAMINECTOMY RESECTION OF MASS/     Surgeon(s):  Krupa Barrios MD    Assistant:  Surgical Assistant: Nelia Mccann    Anesthesia: General    Estimated Blood Loss (mL): less than 50     Complications: None    Specimens:   ID Type Source Tests Collected by Time Destination   1 : Epidural Mass L2-3  Tissue Back SURGICAL PATHOLOGY Krupa Barrios MD 8/25/2023 1254        Implants:  * No implants in log *      Drains: * No LDAs found *    Findings: left epidural mass possibly hnp      Electronically signed by Flo Stallworth MD on 8/25/2023 at 1:13 PM

## 2023-08-25 NOTE — PROGRESS NOTES
Spiritual Care Assessment/Progress Note  [facility]    Name: Davide Weber MRN: [id number]    Age: 63 y.o. Sex: male   Language: English     Date: 8/25/2023            Total Time Calculated: 34 min              Spiritual Assessment begun in [facility] SURGERY  Service Provided For[de-identified] Patient  Referral/Consult From[de-identified] Patient  Encounter Overview/Reason : Advance Care Planning    Spiritual beliefs:      [x] Involved in a carlos eduardo tradition/spiritual practice: Tom Deleon      [] Supported by a carlos eduardo community:      [] Claims no spiritual orientation:      [] Seeking spiritual identity:           [] Adheres to an individual form of spirituality:      [] Not able to assess:                Identified resources for coping and support system:   Support System: Significant other, Children       [] Prayer                  [] Devotional reading               [] Music                  [] Guided Imagery     [] Pet visits                                        [] Other: (COMMENT)     Specific area/focus of visit   Encounter:    Crisis:    Spiritual/Emotional needs: Type: Spiritual Support  Ritual, Rites and Sacraments:    Grief, Loss, and Adjustments: Type: Anticipatory Grief, Life Adjustments, Adjustment to illness  Ethics/Mediation:    Behavioral Health:    Palliative Care: Advance Care Planning:      Plan/Referrals: Other (Comment) (Please contact spiritual care for future services.)    Narrative:   Referral source:   Davide Weber at [facility] in [address] [place] [address]. I reviewed the medical record prior to this encounter. Spiritual Assessment:     'Lazarus Garduno' desired to have an Advance Directive completed prior to his surgery. He name is significant other Hermelindo Lazo ans his Primary HCPOA and his son, Melissa Castellano as his Secondary HCPOA. Lazarus Garduno is a Yarsanism, he shared that he attends several different [employer] community services - no spiritual needs noted. He is currently going through some personal family issues.  He is

## 2023-08-25 NOTE — ANESTHESIA POSTPROCEDURE EVALUATION
Department of Anesthesiology  Postprocedure Note    Patient: Verna Tejada  MRN: [id number]  YOB: 1965  Date of evaluation: 8/25/2023      Procedure Summary     Date: 08/25/23 Room / Location: [facility] MAIN OR 02 / [facility] MAIN OR    Anesthesia Start: 1202 Anesthesia Stop: 6737    Procedure: L2-3 LAMINECTOMY RESECTION OF MASS/  (Spine Lumbar) Diagnosis:       Lumbar epidural mass (HCC)      (Lumbar epidural mass ([address]) [G95.89])    Providers: Kamila Vazquez MD Responsible Provider: Reza Valenzuela MD    Anesthesia Type: General ASA Status: 2          Anesthesia Type: General    Zina Phase I: Zina Score: 9    Zina Phase II:        Anesthesia Post Evaluation    Patient location during evaluation: PACU  Level of consciousness: awake  Airway patency: patent  Nausea & Vomiting: no nausea  Complications: no  Cardiovascular status: hemodynamically stable  Respiratory status: acceptable  Hydration status: stable  Multimodal analgesia pain management approach  Pain management: adequate

## 2023-08-26 VITALS
HEIGHT: 72 IN | WEIGHT: 212.96 LBS | SYSTOLIC BLOOD PRESSURE: 128 MMHG | DIASTOLIC BLOOD PRESSURE: 77 MMHG | OXYGEN SATURATION: 100 % | RESPIRATION RATE: 18 BRPM | BODY MASS INDEX: 28.85 KG/M2 | TEMPERATURE: 98.1 F | HEART RATE: 54 BPM

## 2023-08-26 LAB
ANION GAP SERPL CALC-SCNC: 6 MMOL/L (ref 5–15)
BASOPHILS # BLD: 0 K/UL (ref 0–0.1)
BASOPHILS NFR BLD: 0 % (ref 0–1)
BUN SERPL-MCNC: 24 MG/DL (ref 6–20)
BUN/CREAT SERPL: 28 (ref 12–20)
CALCIUM SERPL-MCNC: 8.1 MG/DL (ref 8.5–10.1)
CHLORIDE SERPL-SCNC: 106 MMOL/L (ref 97–108)
CO2 SERPL-SCNC: 25 MMOL/L (ref 21–32)
CREAT SERPL-MCNC: 0.85 MG/DL (ref 0.7–1.3)
DIFFERENTIAL METHOD BLD: ABNORMAL
EOSINOPHIL # BLD: 0 K/UL (ref 0–0.4)
EOSINOPHIL NFR BLD: 0 % (ref 0–7)
ERYTHROCYTE [DISTWIDTH] IN BLOOD BY AUTOMATED COUNT: 12 % (ref 11.5–14.5)
GLUCOSE SERPL-MCNC: 105 MG/DL (ref 65–100)
HCT VFR BLD AUTO: 43 % (ref 36.6–50.3)
HGB BLD-MCNC: 14.3 G/DL (ref 12.1–17)
IMM GRANULOCYTES # BLD AUTO: 0.1 K/UL (ref 0–0.04)
IMM GRANULOCYTES NFR BLD AUTO: 1 % (ref 0–0.5)
LYMPHOCYTES # BLD: 1 K/UL (ref 0.8–3.5)
LYMPHOCYTES NFR BLD: 6 % (ref 12–49)
MCH RBC QN AUTO: 29.7 PG (ref 26–34)
MCHC RBC AUTO-ENTMCNC: 33.3 G/DL (ref 30–36.5)
MCV RBC AUTO: 89.2 FL (ref 80–99)
MONOCYTES # BLD: 1.2 K/UL (ref 0–1)
MONOCYTES NFR BLD: 7 % (ref 5–13)
NEUTS SEG # BLD: 14.5 K/UL (ref 1.8–8)
NEUTS SEG NFR BLD: 86 % (ref 32–75)
NRBC # BLD: 0 K/UL (ref 0–0.01)
NRBC BLD-RTO: 0 PER 100 WBC
PLATELET # BLD AUTO: 300 K/UL (ref 150–400)
PMV BLD AUTO: 10.3 FL (ref 8.9–12.9)
POTASSIUM SERPL-SCNC: 4.1 MMOL/L (ref 3.5–5.1)
RBC # BLD AUTO: 4.82 M/UL (ref 4.1–5.7)
SODIUM SERPL-SCNC: 137 MMOL/L (ref 136–145)
WBC # BLD AUTO: 16.9 K/UL (ref 4.1–11.1)

## 2023-08-26 PROCEDURE — 6370000000 HC RX 637 (ALT 250 FOR IP): Performed by: NEUROLOGICAL SURGERY

## 2023-08-26 PROCEDURE — 36415 COLL VENOUS BLD VENIPUNCTURE: CPT

## 2023-08-26 PROCEDURE — 6360000002 HC RX W HCPCS: Performed by: NEUROLOGICAL SURGERY

## 2023-08-26 PROCEDURE — 2580000003 HC RX 258: Performed by: FAMILY MEDICINE

## 2023-08-26 PROCEDURE — 80048 BASIC METABOLIC PNL TOTAL CA: CPT

## 2023-08-26 PROCEDURE — 2580000003 HC RX 258: Performed by: NEUROLOGICAL SURGERY

## 2023-08-26 PROCEDURE — 97161 PT EVAL LOW COMPLEX 20 MIN: CPT

## 2023-08-26 PROCEDURE — 85025 COMPLETE CBC W/AUTO DIFF WBC: CPT

## 2023-08-26 RX ORDER — HYDROCODONE BITARTRATE AND ACETAMINOPHEN 7.5; 325 MG/1; MG/1
1 TABLET ORAL EVERY 6 HOURS PRN
Qty: 15 TABLET | Refills: 0 | Status: SHIPPED | OUTPATIENT
Start: 2023-08-26 | End: 2023-08-31

## 2023-08-26 RX ORDER — CYCLOBENZAPRINE HCL 5 MG
5 TABLET ORAL 2 TIMES DAILY PRN
Qty: 10 TABLET | Refills: 0 | Status: SHIPPED | OUTPATIENT
Start: 2023-08-26 | End: 2023-09-05

## 2023-08-26 RX ADMIN — KETOROLAC TROMETHAMINE 30 MG: 30 INJECTION, SOLUTION INTRAMUSCULAR; INTRAVENOUS at 02:59

## 2023-08-26 RX ADMIN — WATER 2000 MG: 1 INJECTION INTRAMUSCULAR; INTRAVENOUS; SUBCUTANEOUS at 03:30

## 2023-08-26 RX ADMIN — SODIUM CHLORIDE, PRESERVATIVE FREE 10 ML: 5 INJECTION INTRAVENOUS at 09:29

## 2023-08-26 RX ADMIN — CITALOPRAM HYDROBROMIDE 20 MG: 20 TABLET ORAL at 09:29

## 2023-08-26 RX ADMIN — SODIUM CHLORIDE, PRESERVATIVE FREE 10 ML: 5 INJECTION INTRAVENOUS at 03:32

## 2023-08-26 RX ADMIN — KETOROLAC TROMETHAMINE 30 MG: 30 INJECTION, SOLUTION INTRAMUSCULAR; INTRAVENOUS at 08:09

## 2023-08-26 RX ADMIN — CYCLOBENZAPRINE 5 MG: 10 TABLET, FILM COATED ORAL at 09:28

## 2023-08-26 RX ADMIN — OXYCODONE HYDROCHLORIDE 5 MG: 5 TABLET ORAL at 09:30

## 2023-08-26 RX ADMIN — SODIUM CHLORIDE, PRESERVATIVE FREE 10 ML: 5 INJECTION INTRAVENOUS at 03:00

## 2023-08-26 NOTE — DISCHARGE SUMMARY
Cognition    x Lucid     Forgetful     Dementia      Catheters/lines (plus indication)    Lubin     PICC     PEG    xx None      Code status    x Full code     DNR      PHYSICAL EXAMINATION AT DISCHARGE:    Not done. Patient left prior to being seen    CHRONIC MEDICAL DIAGNOSES:  Principal Problem:    Degeneration of intervertebral disc of cervical spine without disc herniation  Resolved Problems:    * No resolved hospital problems.  *          Signed:   HONG Carballo NP  8/26/2023  1:16 PM

## 2023-08-26 NOTE — OP NOTE
[facility]  OPERATIVE REPORT    Name:  Beverly Meléndez  MR#:  [id number]  :  1965  ACCOUNT #:  [de-identified]  DATE OF SERVICE:  2023    PREOPERATIVE DIAGNOSIS:  Large L2-3 epidural benign mass with cauda equina syndrome. 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 PROCEDURE PERFORMED:  Complete L2-3 bilateral laminectomy with right medial facetectomy, resection of large epidural mass, likely large disk herniation versus epidural facet cyst.    SURGEON:  Minh Lara MD    ASSISTANT:  Luis Zavala. ANESTHESIA:  General endotracheal anesthesia. COMPLICATIONS:  None. SPECIMENS REMOVED:  Epidural fibrous mass for pathology. IMPLANTS:  None. ESTIMATED BLOOD LOSS:  25 mL. INDICATIONS:  This is a 60-year-old gentleman, who presented with severe intractable back pain, bilateral leg pain, leg weakness, difficulty urinating. Outside MRI showed a large L2-3 left paraspinal extradural mass that appeared to be on the left lateral aspect of the thecal sac and extending even dorsally to the thecal sac. It was thought to be an epidural facet cyst.  The patient was admitted urgently and preop'd and taken to the operating room. Informed consent was obtained. PROCEDURE:  The patient was taken to the operating room and placed under general endotracheal anesthesia. All necessary lines and monitors were placed. Given appropriate dose of IV antibiotics. SCDs were placed. He was placed prone on the Eber frame. All pressure points were padded. Localizing x-ray was obtained. The lumbosacral region was prepped and draped in a standard sterile fashion. Midline incision was made with a skin knife *** , carried down with Bovie electrocautery in the avascular midline plane. Subperiosteal dissection was performed on the lamina of L2-3. Localizing x-ray was obtained. Self-retaining retractor was placed.   Medial facets were dissected out. Leksell rongeur was used to remove the spinous processes of L2-3. High-speed drill was used to thin down the lamina and the medial facets of L2-3. Curettes and Kerrisons were used to perform a complete laminectomy. As I got into the epidural space, the thecal sac was markedly squashed. There was a whitish fibrous appearing lesion on the left side pushing the thecal sac all the way to the right extending dorsally. I widely decompressed the thecal sac bilaterally in the lateral recesses and removing this mass, it was very fibrous, it was probably disk more likely than facet cyst, it appeared benign. I sent some for pathology. I cleaned it out and removed it down to the lateral recess. There was not a clear hole in the annulus as I explored the L3 nerve root on the left-hand side and the disk space. I could not really see a clear hole or connection to the disk space, but the lesion came out in multiple pieces. It was somewhat adherent to the facet joint as well. I was able to clean it out in full and widely decompress the thecal sac and the bilateral L3 nerve roots. No further impingement was noted. The wound was copiously irrigated. Hemostasis was achieved. Retractors were removed. The wound was then closed using 0 Vicryl to close the deep fascial layer, 2-0 Vicryl in the deep dermal layer, running 4-0 Monocryl in a subcuticular fashion. Wounds were cleaned, dried, and dressed with sterile dressing. The patient was flipped over, extubated, and taken to the recovery room in stable condition.       MD GAGAN [id number]  D:  08/26/2023 10:07  T:  08/26/2023 13:04  JOB #:  [id number]  CC:  Macey Pablo MD

## 2023-08-26 NOTE — CARE COORDINATION
CM Note:    This CM attempted initial assessment with patient, but patient was in the process of being dc. Patient did state when asked that he did not have any dc planing concerns.     YUNIOR REYNOSO  10:15 AM

## 2023-08-28 LAB
EKG ATRIAL RATE: 126 BPM
EKG DIAGNOSIS: NORMAL
EKG P AXIS: 64 DEGREES
EKG P-R INTERVAL: 134 MS
EKG Q-T INTERVAL: 306 MS
EKG QRS DURATION: 78 MS
EKG QTC CALCULATION (BAZETT): 443 MS
EKG R AXIS: 75 DEGREES
EKG T AXIS: 43 DEGREES
EKG VENTRICULAR RATE: 126 BPM

## 2023-08-28 PROCEDURE — 93010 ELECTROCARDIOGRAM REPORT: CPT | Performed by: SPECIALIST

## (undated) DEVICE — POSITIONER HD REST FOAM CMFRT TCH

## (undated) DEVICE — SUTURE VCRL SZ 2-0 L18IN ABSRB UD L26MM CP-2 1/2 CIR REV J762D

## (undated) DEVICE — TOOL 14MH30 LEGEND 14CM 3MM: Brand: MIDAS REX ™

## (undated) DEVICE — SUTURE MCRYL SZ 4 0 L18IN ABSRB UD PC 5 L19MM 3 8 CIR SGL Y823G

## (undated) DEVICE — SUTURE VCRL SZ 0 L18IN ABSRB VLT L36MM CT-1 1/2 CIR J740D

## (undated) DEVICE — FLOSEAL HEMOSTATIC MATRIX, 10ML: Brand: FLOSEAL HEMOSTATIC MATRIX

## (undated) DEVICE — BLADE,CARBON-STEEL,15,STRL,DISPOSABLE,TB: Brand: MEDLINE

## (undated) DEVICE — SURGIFOAM SPNG SZ 100

## (undated) DEVICE — BIPOLAR IRRIGATOR INTEGRATED TUBING AND BIPOLAR CORD SET, DISPOSABLE

## (undated) DEVICE — SOLUTION IRRIG 1000ML 0.9% SOD CHL USP POUR PLAS BTL

## (undated) DEVICE — DRAPE SURG W41XL74IN CLR FULL SZ C ARM 3 ADH POLY STRP E

## (undated) DEVICE — BONE WAX WHITE: Brand: BONE WAX WHITE

## (undated) DEVICE — TUBING, SUCTION, 1/4" X 10', STRAIGHT: Brand: MEDLINE

## (undated) DEVICE — SOLUTION IV 250ML 0.9% SOD CHL CLR INJ FLX BG CONT PRT CLSR

## (undated) DEVICE — COVER LT HNDL PLAS RIG 1 PER PK

## (undated) DEVICE — LAMINECTOMY-SMH: Brand: MEDLINE INDUSTRIES, INC.

## (undated) DEVICE — BLADE,CARBON-STEEL,11,STRL,DISPOSABLE,TB: Brand: MEDLINE